# Patient Record
Sex: MALE | Race: WHITE
[De-identification: names, ages, dates, MRNs, and addresses within clinical notes are randomized per-mention and may not be internally consistent; named-entity substitution may affect disease eponyms.]

---

## 2020-01-08 ENCOUNTER — HOSPITAL ENCOUNTER (OUTPATIENT)
Dept: HOSPITAL 76 - DI | Age: 65
Discharge: HOME | End: 2020-01-08
Attending: INTERNAL MEDICINE
Payer: MEDICARE

## 2020-01-08 DIAGNOSIS — M25.471: ICD-10-CM

## 2020-01-08 DIAGNOSIS — M25.571: Primary | ICD-10-CM

## 2020-01-08 NOTE — XRAY REPORT
Reason:  ANKLE PAIN

Procedure Date:  01/08/2020   

Accession Number:  [id number] / [id number]                    

Procedure:  XR  - Ankle 3 View RT CPT Code:  

 

***Final Report***

 

 

FULL RESULT:

 

 

EXAM:

RIGHT ANKLE RADIOGRAPHY

 

EXAM DATE: 1/8/2020 01:00 PM.

 

CLINICAL HISTORY: ANKLE PAIN. Twisted ankle on 12/26/2019.

 

COMPARISON: None.

 

TECHNIQUE: 3 views.

 

FINDINGS:

Bones: There is a screw from medial to lateral within the body of the 

talus. There are no lucent or sclerotic lesions. There is no fracture.

 

Joints: Small ankle joint effusion. No subluxations. The ankle mortise is 

normally aligned.

 

Soft Tissues: Normal. No soft tissue swelling.

IMPRESSION:

1. Small right ankle joint effusion.

2. Screw in the body of the talus.

3. No fracture or malalignment.

 

RADIA

 

The call report notification system was initiated by Dr. Shankar Pak at 

01:27 PM on 1/8/2020.

## 2020-03-16 ENCOUNTER — HOSPITAL ENCOUNTER (OUTPATIENT)
Dept: HOSPITAL 76 - DI | Age: 65
Discharge: HOME | End: 2020-03-16
Attending: INTERNAL MEDICINE
Payer: MEDICARE

## 2020-03-16 DIAGNOSIS — M25.571: Primary | ICD-10-CM

## 2020-03-16 NOTE — XRAY REPORT
Reason:  PAIN IN RIGHT ANKLE AND JOINTS OF RIGHT FOOT

Procedure Date:  03/16/2020   

Accession Number:  [id number] / [id number]                    

Procedure:  XR  - Foot 3 View RT CPT Code:  

 

***Final Report***

 

 

FULL RESULT:

 

 

EXAM:

RIGHT FOOT RADIOGRAPHY

 

EXAM DATE: 3/16/2020 12:30 PM.

 

CLINICAL HISTORY: Pain in right ankle and joints of right foot.

 

COMPARISON: None.

 

TECHNIQUE: 3 views.

 

FINDINGS:

Bones: There are cortical step-offs at the mid diaphysis of the 1st 

proximal phalanx, consistent with fracture. No additional fractures 

detected. Intact fixation screw at the talus. No abnormal subcutaneous 

radiopaque foreign bodies identified.

IMPRESSION: Findings consistent with nondisplaced fracture at the 1st 

proximal phalanx.

 

RADIA

## 2020-03-16 NOTE — XRAY REPORT
Reason:  PAIN IN RIGHT ANKLE AND JOINTS OF RIGHT FOOT

Procedure Date:  03/16/2020   

Accession Number:  [id number] / [id number]                    

Procedure:  XR  - Ankle 3 View RT CPT Code:  

 

***Final Report***

 

 

FULL RESULT:

 

 

EXAM:

RIGHT ANKLE RADIOGRAPHY

 

EXAM DATE: 03/16/2020 12:30 PM.

 

CLINICAL HISTORY: Pain in right ankle and joints of right foot.

 

COMPARISON: ANKLE 3 VIEW RT 01/08/2020 12:41 PM.

 

TECHNIQUE: 3 views.

 

FINDINGS:

Bones: Intact screw at the talus. Subtle cortical step-off suggesting 

possible flake fracture at the distal aspect of the dorsal talus, seen on 

lateral view. No fractures identified elsewhere.

 

Joints: Small ankle joint effusion suggested. Soft tissue prominence at 

the ankle.

IMPRESSION: Question of small flake fracture at the distal talus, seen on 

lateral view.

 

RADIA

## 2021-03-30 ENCOUNTER — HOSPITAL ENCOUNTER (INPATIENT)
Dept: HOSPITAL 76 - ED | Age: 66
LOS: 2 days | Discharge: HOME | DRG: 92 | End: 2021-04-01
Attending: NURSE PRACTITIONER | Admitting: INTERNAL MEDICINE
Payer: MEDICARE

## 2021-03-30 DIAGNOSIS — E72.20: ICD-10-CM

## 2021-03-30 DIAGNOSIS — Z79.899: ICD-10-CM

## 2021-03-30 DIAGNOSIS — J44.9: ICD-10-CM

## 2021-03-30 DIAGNOSIS — G40.919: ICD-10-CM

## 2021-03-30 DIAGNOSIS — G93.89: ICD-10-CM

## 2021-03-30 DIAGNOSIS — G92: Primary | ICD-10-CM

## 2021-03-30 DIAGNOSIS — Z20.822: ICD-10-CM

## 2021-03-30 DIAGNOSIS — Z79.51: ICD-10-CM

## 2021-03-30 DIAGNOSIS — F17.210: ICD-10-CM

## 2021-03-30 DIAGNOSIS — T42.6X5A: ICD-10-CM

## 2021-03-30 DIAGNOSIS — E87.0: ICD-10-CM

## 2021-03-30 LAB
ALBUMIN DIAFP-MCNC: 3.3 G/DL (ref 3.2–5.5)
ALBUMIN/GLOB SERPL: 1.1 {RATIO} (ref 1–2.2)
ALP SERPL-CCNC: 45 IU/L (ref 42–121)
ALT SERPL W P-5'-P-CCNC: 18 IU/L (ref 10–60)
AMPHET UR QL SCN: NEGATIVE
ANION GAP SERPL CALCULATED.4IONS-SCNC: 11 MMOL/L (ref 6–13)
ANION GAP SERPL CALCULATED.4IONS-SCNC: 11 MMOL/L (ref 6–13)
ANION GAP SERPL CALCULATED.4IONS-SCNC: 15 MMOL/L (ref 6–13)
AST SERPL W P-5'-P-CCNC: 20 IU/L (ref 10–42)
BARBITURATES UR QL SCN>300 NG/ML: NEGATIVE
BASOPHILS NFR BLD AUTO: 0 10^3/UL (ref 0–0.1)
BASOPHILS NFR BLD AUTO: 0.4 %
BENZODIAZ UR QL SCN: POSITIVE
BILIRUB BLD-MCNC: 0.5 MG/DL (ref 0.2–1)
BUN SERPL-MCNC: 20 MG/DL (ref 6–20)
BUN SERPL-MCNC: 20 MG/DL (ref 6–20)
BUN SERPL-MCNC: 22 MG/DL (ref 6–20)
CALCIUM UR-MCNC: 9.5 MG/DL (ref 8.5–10.3)
CALCIUM UR-MCNC: 9.6 MG/DL (ref 8.5–10.3)
CALCIUM UR-MCNC: 9.7 MG/DL (ref 8.5–10.3)
CHLORIDE SERPL-SCNC: 107 MMOL/L (ref 101–111)
CHLORIDE SERPL-SCNC: 111 MMOL/L (ref 101–111)
CHLORIDE SERPL-SCNC: 111 MMOL/L (ref 101–111)
CO2 SERPL-SCNC: 24 MMOL/L (ref 21–32)
CO2 SERPL-SCNC: 24 MMOL/L (ref 21–32)
CO2 SERPL-SCNC: 25 MMOL/L (ref 21–32)
COCAINE UR-SCNC: NEGATIVE UMOL/L
CREAT SERPLBLD-SCNC: 0.7 MG/DL (ref 0.6–1.2)
EOSINOPHIL # BLD AUTO: 0.2 10^3/UL (ref 0–0.7)
EOSINOPHIL NFR BLD AUTO: 2 %
ERYTHROCYTE [DISTWIDTH] IN BLOOD BY AUTOMATED COUNT: 12.5 % (ref 12–15)
ETHANOL BLD-MCNC: < 5 MG/DL
GFRSERPLBLD MDRD-ARVRAT: 114 ML/MIN/{1.73_M2} (ref 89–?)
GLOBULIN SER-MCNC: 3 G/DL (ref 2.1–4.2)
GLUCOSE SERPL-MCNC: 105 MG/DL (ref 70–100)
GLUCOSE SERPL-MCNC: 109 MG/DL (ref 70–100)
GLUCOSE SERPL-MCNC: 94 MG/DL (ref 70–100)
HCT VFR BLD AUTO: 40.4 % (ref 42–52)
HGB UR QL STRIP: 12.9 G/DL (ref 14–18)
LYMPHOCYTES # SPEC AUTO: 2.6 10^3/UL (ref 1.5–3.5)
LYMPHOCYTES NFR BLD AUTO: 35.3 %
MCH RBC QN AUTO: 31.5 PG (ref 27–31)
MCHC RBC AUTO-ENTMCNC: 31.9 G/DL (ref 32–36)
MCV RBC AUTO: 98.5 FL (ref 80–94)
METHADONE UR QL SCN: NEGATIVE
METHAMPHET UR QL SCN: NEGATIVE
MONOCYTES # BLD AUTO: 1.1 10^3/UL (ref 0–1)
MONOCYTES NFR BLD AUTO: 14.5 %
NEUTROPHILS # BLD AUTO: 3.5 10^3/UL (ref 1.5–6.6)
NEUTROPHILS # SNV AUTO: 7.4 X10^3/UL (ref 4.8–10.8)
NEUTROPHILS NFR BLD AUTO: 47.4 %
NRBC # BLD AUTO: 0 /100WBC
NRBC # BLD AUTO: 0 X10^3/UL
OPIATES UR QL SCN: NEGATIVE
PDW BLD AUTO: 11.6 FL (ref 7.4–11.4)
PLATELET # BLD: 155 10^3/UL (ref 130–450)
POTASSIUM SERPL-SCNC: 4.1 MMOL/L (ref 3.5–5)
POTASSIUM SERPL-SCNC: 4.2 MMOL/L (ref 3.5–5)
POTASSIUM SERPL-SCNC: 4.7 MMOL/L (ref 3.5–5)
PROT SPEC-MCNC: 6.3 G/DL (ref 6.7–8.2)
RBC MAR: 4.1 10^6/UL (ref 4.7–6.1)
SODIUM SERPLBLD-SCNC: 146 MMOL/L (ref 135–145)
SODIUM SERPLBLD-SCNC: 146 MMOL/L (ref 135–145)
SODIUM SERPLBLD-SCNC: 147 MMOL/L (ref 135–145)
THC UR QL SCN: NEGATIVE
VALPROATE SERPL-SCNC: 60.6 UG/ML
VALPROATE SERPL-SCNC: 72 UG/ML
VALPROATE SERPL-SCNC: 82.6 UG/ML
VOLATILE DRUGS POS SERPL SCN: (no result)

## 2021-03-30 PROCEDURE — 82140 ASSAY OF AMMONIA: CPT

## 2021-03-30 PROCEDURE — 83735 ASSAY OF MAGNESIUM: CPT

## 2021-03-30 PROCEDURE — 85025 COMPLETE CBC W/AUTO DIFF WBC: CPT

## 2021-03-30 PROCEDURE — 80306 DRUG TEST PRSMV INSTRMNT: CPT

## 2021-03-30 PROCEDURE — 80320 DRUG SCREEN QUANTALCOHOLS: CPT

## 2021-03-30 PROCEDURE — 80048 BASIC METABOLIC PNL TOTAL CA: CPT

## 2021-03-30 PROCEDURE — 80164 ASSAY DIPROPYLACETIC ACD TOT: CPT

## 2021-03-30 PROCEDURE — 84100 ASSAY OF PHOSPHORUS: CPT

## 2021-03-30 PROCEDURE — 0202U NFCT DS 22 TRGT SARS-COV-2: CPT

## 2021-03-30 PROCEDURE — 96361 HYDRATE IV INFUSION ADD-ON: CPT

## 2021-03-30 PROCEDURE — 36415 COLL VENOUS BLD VENIPUNCTURE: CPT

## 2021-03-30 PROCEDURE — 96374 THER/PROPH/DIAG INJ IV PUSH: CPT

## 2021-03-30 PROCEDURE — 94640 AIRWAY INHALATION TREATMENT: CPT

## 2021-03-30 PROCEDURE — 99285 EMERGENCY DEPT VISIT HI MDM: CPT

## 2021-03-30 PROCEDURE — 80053 COMPREHEN METABOLIC PANEL: CPT

## 2021-03-30 PROCEDURE — 93005 ELECTROCARDIOGRAM TRACING: CPT

## 2021-03-30 PROCEDURE — 87631 RESP VIRUS 3-5 TARGETS: CPT

## 2021-03-30 PROCEDURE — 70450 CT HEAD/BRAIN W/O DYE: CPT

## 2021-03-30 RX ADMIN — SODIUM CHLORIDE, PRESERVATIVE FREE SCH ML: 5 INJECTION INTRAVENOUS at 23:44

## 2021-03-30 RX ADMIN — LACTULOSE SCH GM: 10 SOLUTION ORAL at 23:44

## 2021-03-30 NOTE — ED PHYSICIAN DOCUMENTATION
History of Present Illness





- Stated complaint


Stated Complaint: WOBBLY/INCOHERENT





- Chief complaint


Chief Complaint: General





- History obtained from


History obtained from: Patient, Family (sister)





- Additonal information


Additional information: 





65-year-old gentleman arrives accompanied by his sister to the emergency 

department for evaluation of altered mental status this morning.  He has a long 

history of epilepsy since age 9 and has had a couple of craniotomies for same 

the most recent being 40 years ago.  He is never been seizure-free and he is 

still maintained on multiple seizure medications including Fycompa Depakote 

Trileptal and lorazepam.  Recently the Fycompa doses have been going up.  Early 

this morning he awoke with some neck pain and took a Percogesic.  Then got up 

later and his sister said he was quite slow to answer questions and nodding off 

and his brain just did not seem to be working right.  He seems back to baseline 

now without specific complaints.  It is unclear if he took a single Percogesic 

or multiple since the patient does not actually remember getting up and taking 

it.





Review of Systems


Ten Systems: 10 systems reviewed and negative


Constitutional: denies: Fever, Chills


Eyes: reports: Reviewed and negative


Ears: reports: Reviewed and negative


Nose: reports: Reviewed and negative


Throat: reports: Reviewed and negative


Cardiac: reports: Reviewed and negative





PD PAST MEDICAL HISTORY





- Past Medical History


Past Medical History: Yes


Respiratory: COPD


Neuro: Seizure disorder





- Past Surgical History


Past Surgical History: Yes


Neuro: Craniotomy





- Present Medications


Home Medications: 


                                Ambulatory Orders











 Medication  Instructions  Recorded  Confirmed


 


Albuterol Sulf [Ventolin Hfa 40 mg .ROUTE DAILY 03/30/21 03/30/21





Inhaler]   


 


Atenolol [Tenormin] 25 mg PO DAILY 03/30/21 03/30/21


 


Budesonide [Pulmicort Flexhaler] 80 mcg .ROUTE BID 03/30/21 03/30/21


 


Divalproex ER [Depakote ER] 250 mg PO QID 03/30/21 03/30/21


 


Lorazepam [Ativan] 1 mg PO BID 03/30/21 03/30/21


 


OXcarbazepine [Trileptal] 600 mg PO BID 03/30/21 03/30/21


 


Perampanel [Fycompa] 8 mg PO DAILY 03/30/21 03/30/21


 


Pravastatin [Pravachol] 40 mg PO DAILY 03/30/21 03/30/21


 


Umeclidinium Bromide [Incruse 62.5 mcg .ROUTE DAILY 03/30/21 03/30/21





Ellipta]   


 


Zonisamide [Zonegran] 100 mg PO QID 03/30/21 03/30/21














- Allergies


Allergies/Adverse Reactions: 


                                    Allergies











Allergy/AdvReac Type Severity Reaction Status Date / Time


 


Iodinated Contrast Media Allergy  Rash Verified 03/30/21 13:02














- Social History


Does the pt smoke?: Yes


Smoking Status: Current every day smoker


Does the pt drink ETOH?: No


Does the pt have substance abuse?: Yes


Substance Use and Type: CBD oil / Products





- Immunizations


Immunizations are current?: Yes





- POLST


Patient has POLST: No





PD ED PE NORMAL





- Vitals


Vital signs reviewed: Yes





- General


General: No acute distress, Other (He is slow to answer questions, states the 

date is March 28 of 21, has some word finding difficulties, but the sister says 

he is at his baseline.)





- HEENT


HEENT: PERRL (With nystagmus)





- Neck


Neck: Supple, no meningeal sign, No bony TTP





- Cardiac


Cardiac: RRR, No murmur





- Respiratory


Respiratory: No respiratory distress





- Abdomen


Abdomen: Normal bowel sounds, Soft, Non tender





- Back


Back: No CVA TTP, No spinal TTP





- Derm


Derm: Normal color, Warm and dry





- Extremities


Extremities: No edema, No calf tenderness / cord





- Neuro


Neuro: No motor deficit, No sensory deficit, Other (He is alert oriented to 

person and place and close on time.  He has good strength throughout all 4 

extremities.  He has nystagmus and mild ataxia on finger-to-nose testing.)


Eye Opening: Spontaneous


Motor: Obeys Commands





- Psych


Psych: Normal mood, Normal affect





Results





- Vitals


Vitals: 


                               Vital Signs - 24 hr











  03/30/21 03/30/21 03/30/21





  13:03 13:36 14:48


 


Temperature 36.4 C L  


 


Heart Rate 57 L 58 L 55 L


 


Respiratory 18 14 21





Rate   


 


Blood Pressure 104/58 L 113/57 L 117/63


 


O2 Saturation 99 100 97














  03/30/21 03/30/21 03/30/21





  16:10 16:56 18:09


 


Temperature 36.7 C  


 


Heart Rate 61 55 L 59 L


 


Respiratory 20 20 23





Rate   


 


Blood Pressure 129/67 113/61 118/72


 


O2 Saturation 99 100 100














  03/30/21





  20:07


 


Temperature 


 


Heart Rate 56 L


 


Respiratory 18





Rate 


 


Blood Pressure 117/68


 


O2 Saturation 100








                                     Oxygen











O2 Source                      Room air

















- EKG (time done)


  ** 1508


Rate: Rate (enter#) (55)


Rhythm: NSR


Axis: Normal


Intervals: Normal [place]


QRS: Normal


Ischemia: Normal ST segments


Computer interpretation: Agree with computer





- Labs


Labs: 


                                Laboratory Tests











  03/30/21 03/30/21 03/30/21





  14:26 14:26 14:26


 


WBC  7.4  


 


RBC  4.10 L  


 


Hgb  12.9 L  


 


Hct  40.4 L  


 


MCV  98.5 H  


 


MCH  31.5 H  


 


MCHC  31.9 L  


 


RDW  12.5  


 


Plt Count  155  


 


MPV  11.6 H  


 


Neut # (Auto)  3.5  


 


Lymph # (Auto)  2.6  


 


Mono # (Auto)  1.1 H  


 


Eos # (Auto)  0.2  


 


Baso # (Auto)  0.0  


 


Absolute Nucleated RBC  0.00  


 


Nucleated RBC %  0.0  


 


Sodium   147 H 


 


Potassium   4.7 


 


Chloride   111 


 


Carbon Dioxide   25 


 


Anion Gap   11.0 


 


BUN   20 


 


Creatinine   0.7 


 


Estimated GFR (MDRD)   114 


 


Glucose   94 


 


Calcium   9.6 


 


Total Bilirubin   0.5 


 


AST   20 


 


ALT   18 


 


Alkaline Phosphatase   45 


 


Ammonia    66.1 H


 


Total Protein   6.3 L 


 


Albumin   3.3 


 


Globulin   3.0 


 


Albumin/Globulin Ratio   1.1 


 


Last Dose Date   Not Reportable 


 


Last Dose Time   Not Reportable 


 


Urine Opiates Screen   


 


Ur Oxycodone Screen   


 


Urine Methadone Screen   


 


Ur Propoxyphene Screen   


 


Ur Barbiturates Screen   


 


Valproic Acid   82.6 


 


Ur Tricyclics Screen   


 


Ur Phencyclidine Scrn   


 


Ur Amphetamine Screen   


 


U Methamphetamines Scrn   


 


U Benzodiazepines Scrn   


 


Urine Cocaine Screen   


 


U Cannabinoids Screen   


 


Ethyl Alcohol   < 5.0 














  03/30/21 03/30/21 03/30/21





  16:21 17:55 17:55


 


WBC   


 


RBC   


 


Hgb   


 


Hct   


 


MCV   


 


MCH   


 


MCHC   


 


RDW   


 


Plt Count   


 


MPV   


 


Neut # (Auto)   


 


Lymph # (Auto)   


 


Mono # (Auto)   


 


Eos # (Auto)   


 


Baso # (Auto)   


 


Absolute Nucleated RBC   


 


Nucleated RBC %   


 


Sodium   146 H 


 


Potassium   4.1 


 


Chloride   111 


 


Carbon Dioxide   24 


 


Anion Gap   11.0 


 


BUN   22 H 


 


Creatinine   0.7 


 


Estimated GFR (MDRD)   114 


 


Glucose   105 H 


 


Calcium   9.7 


 


Total Bilirubin   


 


AST   


 


ALT   


 


Alkaline Phosphatase   


 


Ammonia    67.0 H


 


Total Protein   


 


Albumin   


 


Globulin   


 


Albumin/Globulin Ratio   


 


Last Dose Date   UNKNOWN 


 


Last Dose Time   UNKNOWN 


 


Urine Opiates Screen  NEGATIVE  


 


Ur Oxycodone Screen  NEGATIVE  


 


Urine Methadone Screen  NEGATIVE  


 


Ur Propoxyphene Screen  NEGATIVE  


 


Ur Barbiturates Screen  NEGATIVE  


 


Valproic Acid   72.0 


 


Ur Tricyclics Screen  NEGATIVE  


 


Ur Phencyclidine Scrn  NEGATIVE  


 


Ur Amphetamine Screen  NEGATIVE  


 


U Methamphetamines Scrn  NEGATIVE  


 


U Benzodiazepines Scrn  POSITIVE H  


 


Urine Cocaine Screen  NEGATIVE  


 


U Cannabinoids Screen  NEGATIVE  


 


Ethyl Alcohol   














  03/30/21 03/30/21





  21:41 21:41


 


WBC  


 


RBC  


 


Hgb  


 


Hct  


 


MCV  


 


MCH  


 


MCHC  


 


RDW  


 


Plt Count  


 


MPV  


 


Neut # (Auto)  


 


Lymph # (Auto)  


 


Mono # (Auto)  


 


Eos # (Auto)  


 


Baso # (Auto)  


 


Absolute Nucleated RBC  


 


Nucleated RBC %  


 


Sodium  146 H 


 


Potassium  4.2 


 


Chloride  107 


 


Carbon Dioxide  24 


 


Anion Gap  15.0 H 


 


BUN  20 


 


Creatinine  0.7 


 


Estimated GFR (MDRD)  114 


 


Glucose  109 H 


 


Calcium  9.5 


 


Total Bilirubin  


 


AST  


 


ALT  


 


Alkaline Phosphatase  


 


Ammonia   82.4 H*


 


Total Protein  


 


Albumin  


 


Globulin  


 


Albumin/Globulin Ratio  


 


Last Dose Date  Not Reportable 


 


Last Dose Time  Not Reportable 


 


Urine Opiates Screen  


 


Ur Oxycodone Screen  


 


Urine Methadone Screen  


 


Ur Propoxyphene Screen  


 


Ur Barbiturates Screen  


 


Valproic Acid  60.6 


 


Ur Tricyclics Screen  


 


Ur Phencyclidine Scrn  


 


Ur Amphetamine Screen  


 


U Methamphetamines Scrn  


 


U Benzodiazepines Scrn  


 


Urine Cocaine Screen  


 


U Cannabinoids Screen  


 


Ethyl Alcohol  














- Rads (name of study)


  ** Ct Head


Radiology: Final report received, Discussed with dallin [facility]





- ED course


ED course: 





65-year-old gentleman with long history of epilepsy on multiple meds for same 

presents with altered mental status now improved.  He did have mild asterixis on

 exam and is on Depakote.  So VHE is a possibility as well as hyponatremia from 

the Trileptal, or supratherapeutic levels of any of his other meds.  He was 

found to have hyperammonemia at a level of 66 with a therapeutic Depakote level 

at 82.6.  This was discussed with poison control and they recommend p.o. fluids,

 free water especially noting the sodium is up and a redraw in 4 hours to make 

sure it is improving and if so he could probably be discharged if no other 

pertinent positive findings on a decreased dose of Depakote.





IMPRESSION: 


1. CT head without acute intracranial abnormalities. Specifically, no acute 

hemorrhage, suspicious 


mass, or mass effect. 


2. 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 

changes of right frontal


craniotomy. 


3. Age-related senescent changes and sequela of chronic small vessel ischemic 

disease. 


4. Asymmetrically sized choroid plexus with the right side being larger than the

 left. No mass 


effect or hydrocephalus. 





At 6 PM the levels were redrawn, his Depakote level went down to 72 but the 

ammonia was basically the same.  I recontacted poison control and this time he 

had me to speak with the , Dr. Padilla.  He said to check it again 

in 4 hours because they definitely want to see the ammonia trending down before 

discharge.





10 PM levels unfortunately continue to show even more of an elevation in ammonia

 and spoke with Dr. Mills and he will observe the patient.





Departure





- Departure


Disposition: ED Place in Observation


Clinical Impression: 


 Hyperammonemia, Encephalopathy





Epilepsy


Qualifiers:


 Epilepsy type: unspecified Intractability: intractable Status epilepticus: 

without status epilepticus Qualified Code(s): G40.919 - Epilepsy, unspecified, 

intractable, without status epilepticus





Condition: Stable

## 2021-03-30 NOTE — CT REPORT
PROCEDURE:  HEAD WO

 

INDICATIONS:  altered

 

TECHNIQUE:  

Noncontrast 4.5 mm thick angled axial sections acquired from the foramen magnum to the vertex.  For r
adiation dose reduction, the following was used:  automated exposure control, adjustment of mA and/or
 kV according to patient size.

 

COMPARISON:  None.

 

FINDINGS:  

Image quality:  Excellent.  

 

CSF spaces:  Basal cisterns are patent.  No extra-axial fluid collections.  Ventricles are symmetric 
in size and configuration.  

 

Brain:  No midline shift.  No intracranial masses or acute intracranial hemorrhage.  Gray-white matte
r interface is normal.  There is right frontoparietal encephalomalacia underlying postsurgical change
s of previous right frontal craniotomy. There is asymmetric size of choroid plexus bilaterally. This 
is seen in the trigone region of the lateral ventricles. The right side is larger than the left. Chor
oid plexus calcifications are present. There is also mild diffuse cortical volume loss. Mild perivent
ricular white matter hypodensity in relation compatible with sequela of chronic small vessel ischemic
 disease. Atherosclerotic calcifications of the bilateral intracranial segments of the internal carot
id arteries are visualized.

 

Skull and face:  Calvarium and visualized facial bones are intact, without suspicious lesions or acut
e calvarial fractures. Postoperative changes from remote right frontal craniotomy..  

 

Sinuses:  Visualized sinuses and mastoids are clear.  

 

IMPRESSION:  

1. CT head without acute intracranial abnormalities. Specifically, no acute hemorrhage, suspicious ma
ss, or mass effect.

2. Chronic right frontoparietal encephalomalacia underlying prior surgical changes of right frontal c
raniotomy.

3. Age-related senescent changes and sequela of chronic small vessel ischemic disease.

4. Asymmetrically sized choroid plexus with the right side being larger than the left. No mass effect
 or hydrocephalus.

 

Reviewed by: David Wang MD on 3/30/2021 3:41 PM PDT

Approved by: David Wang MD on 3/30/2021 3:41 PM PDT

 

 

Station ID:  [id number]

## 2021-03-30 NOTE — HISTORY & PHYSICAL EXAMINATION
Chief Complaint





- Chief Complaint


Chief Complaint: Unsteadiness





History of Present Illness





- Admitted From


Admitted From:: Home





- History Obtained From


Records Reviewed: Yes


History obtained from: Patient, Sister, ER Physician, EMR





- History of Present Illness


HPI Comment/Other: 


This is a 65-year-old male with a past medical history significant for epilepsy,

COPD who presents today from home as his sister was concerned that he was quite 

unsteady on his feet.  History is obtained from both the patient and his sister 

who is present at bedside.  The patient has unfortunately poorly controlled 

epilepsy with near daily seizures.  He is on multiple antiepileptics at home.  

His sister tells me that he is normally a little unsteady and can be interm

ittently confused and due to the antiepileptics as well as occasional episodes 

of being postictal.  He can also be mildly sedated at times it due to the Ativan

he is on.  She states that this morning he was much more unsteady compared to 

usual and was not himself.  The patient admits to taking Percogesic and believes

he only took 1 tablet last night.  He takes this once a week for ankle pain 

which is chronic for him.  His most recent seizure was today in the emergency 

department.  He reports that they are usually minor and predominately involve 

his head and right upper extremity.  He does not have grand mal seizures.  His 

neurologist is at [facility].  He was recently started on a new medication 

called Fycompa.  He has been on Depakote for many years.  He denies any prior 

history of liver disease.  He does not drink alcohol.  He denies passing out 

today.





In the emergency department, he was found to be afebrile with temperature of 

36.4 C.  His heart rate was in the 50s.  Blood pressure was 113/57.  He was not

tachypneic and saturating well on room air.  Labs were significant for his 

sodium of 147 and an ammonia level of 66.1.  Initial Depakote level is 82.6.  

These findings were discussed with poison control who recommended rechecking 

labs in 4 hours.  His Depakote level was trending down to 72 but his ammonia 

remained elevated at 67.  They recommended rechecking labs again in 4 hours to 

ensure that the ammonia begins to trend down.  This later increased to 82.4.  

Given the rise in the ammonia level, medicine was consulted for admission.





I did discuss goals of care the patient and he would like to be a full code.





History





- Past Medical History


Respiratory: reports: COPD


Neuro: reports: Seizure disorder


MRSA Hx?: No





- Past Surgical History


Neuro: reports: Craniotomy





- Family & Social History


Family History Comment/Other: His father passed away from metastatic renal 

cancer.  His mother had hypertension. His older brother was recently diagnosed 

with coronary artery disease and underwent CABG.


Living arrangement: At home


Living Situation: With family


Social History Notes: He lives at home with his sister, Simran.  He previously 

worked at [employer] and retired in 1995.  He smokes a pack a day for about

25 years.  Denies alcohol use.





- POLST


Patient has POLST: No





Meds/Allgy





- Home Medications


Home Medications: 


                                Ambulatory Orders











 Medication  Instructions  Recorded  Confirmed


 


Albuterol Sulf [Ventolin Hfa 40 mg .ROUTE DAILY 03/30/21 03/30/21





Inhaler]   


 


Atenolol [Tenormin] 25 mg PO DAILY 03/30/21 03/30/21


 


Budesonide [Pulmicort Flexhaler] 80 mcg .ROUTE BID 03/30/21 03/30/21


 


Divalproex ER [Depakote ER] 250 mg PO QID 03/30/21 03/30/21


 


Lorazepam [Ativan] 1 mg PO BID 03/30/21 03/30/21


 


OXcarbazepine [Trileptal] 600 mg PO BID 03/30/21 03/30/21


 


Perampanel [Fycompa] 8 mg PO DAILY 03/30/21 03/30/21


 


Pravastatin [Pravachol] 40 mg PO DAILY 03/30/21 03/30/21


 


Umeclidinium Bromide [Incruse 62.5 mcg .ROUTE DAILY 03/30/21 03/30/21





Ellipta]   


 


Zonisamide [Zonegran] 100 mg PO QID 03/30/21 03/30/21














- Allergies


Allergies/Adverse Reactions: 


                                    Allergies











Allergy/AdvReac Type Severity Reaction Status Date / Time


 


Iodinated Contrast Media Allergy  Rash Verified 03/30/21 13:02














Review of Systems





- Constitutional


Constitutional: denies: Fatigue, Fever, Chills





- Eyes


Eyes: denies: Blurred vision





- Ears, Nose & Throat


Ears, Nose & Throat: denies: Nasal discharge, Sore throat





- Cardiovascular


Cariovascular: denies: Chest pain, Edema, Lightheadedness, Syncope, Exertional 

dyspnea, Decr. exercise tolerance





- Respiratory


Respiratory: denies: Cough, SOB at rest, SOB with exertion





- Gastrointestinal


Gastrointestinal: denies: Abdominal pain, Constipation, Nausea, Vomiting





- Genitourinary


Genitourinary: denies: Dysuria, Urgency, Hematuria





- Musculoskeletal


Musculoskeletal: reports: Joint pain





- Neurological


Neurological: reports: Abnormal gait, Seizures.  denies: General weakness, Focal

 weakness, Dizziness, Numbness





- Hematologic/Lymphatic


Hematologic/Lymphatic: denies: Bleeding tendencies





- All Other Systems


All Other Systems: reports: Reviewed and negative


Prior Level of Functionality: 


He ambulates with a walker at baseline due to ankle pain.





Exam





- Vital Signs


Reviewed Vital Signs: Yes


Vital Signs: 





                                Vital Signs x48h











  Temp Pulse Resp BP Pulse Ox


 


 03/30/21 20:07   56 L  18  117/68  100


 


 03/30/21 18:09   59 L  23  118/72  100


 


 03/30/21 16:56   55 L  20  113/61  100


 


 03/30/21 16:10  36.7 C  61  20  129/67  99


 


 03/30/21 14:48   55 L  21  117/63  97














- Physical Exam


General Appearance: positive: No acute distress, Alert


Eyes Bilateral: positive: Normal inspection, Conjunctivae nml


ENT: positive: ENT inspection nml


Neck: positive: Nml inspection


Respiratory: positive: No respiratory distress.  negative: Wheezes, Rales


Cardiovascular: positive: Regular rate & rhythm, No murmur.  negative: 

Tachycardia


Abdomen: positive: Non-tender, No distention.  negative: Tenderness, Guarding, 

Rebound


Skin: positive: Warm, Dry


Extremities: positive: No pedal edema


Neurologic/Psychiatric: positive: Sensation nml, Other (Asterixis noted.  He has

 5 out of 5 motor strength in all 4 extremities.).  negative: Disoriented to 

person, Disoriented to place, Disoriented to time, Facial droop, Slurred/abnml 

speech





Conclusion/Plan





- Problem List


(1) Encephalopathy


Conclusion/Plan: 


This appears to be secondary to hyperammonemia due to the Depakote.  The CT of 

the head showed no acute abnormalities but revealed chronic encephalomalacia.  

His ammonia level continues to increase despite trending down of his Depakote 

level.  We will hold his Depakote for the time being and start him on lactulose.

  Will discuss with pharmacy if carnitine is available.  We will recheck ammonia

 in the morning and I am hopeful if his mentation is improving and it is 

trending down then he can be discharged home.








(2) Hyperammonemia


Conclusion/Plan: 


This is likely secondary to the Depakote.  He does not have evidence of liver 

disease.  This is contributing to his encephalopathy.  We will start him on 

lactulose and hold Depakote.  Will discuss with pharmacy if carnitine is 

available.








(3) Epilepsy


Conclusion/Plan: 


He unfortunately has epilepsy that is poorly controlled with seizures on a near 

daily basis.  We will continue his home antiepileptics but will hold his 

Depakote given the hyperammonemia.  We will asked the daytime provider to 

discuss with neurology regarding other antibiotics that can be used as Depakote 

will likely need to be discontinued on discharge.


Qualifiers: 


   Epilepsy type: unspecified   Intractability: intractable   Status 

epilepticus: without status epilepticus   Qualified Code(s): G40.919 - Epilepsy,

 unspecified, intractable, without status epilepticus   





(4) Hypernatremia


Conclusion/Plan: 


His sodium is elevated at 146 and this is likely due to poor oral intake.  He 

did receive D5/half-normal in the ER without improvement.  We will place him on 

D5 at 100 mL an hour and recheck a BMP in the morning.  We will encourage water 

intake.








(5) COPD (chronic obstructive pulmonary disease)


Conclusion/Plan: 


Stable and not in exacerbation.  We will continue his home inhalers and albutero

l as needed.








- Lab Results


Lab results reviewed: Yes


Fish Bones: 


                                 03/30/21 14:26





                                 03/30/21 21:41





- Diagnostic Imaging Results


Diagnostic Imaging Results: positive: Final report reviewed





Core Measures





- Anticipated LOS


I expect patient to be DC'd or transferred within 96 hours.: Yes





- Issues


Hospital Issues and Management Plan: 


This is a 65-year-old male with epilepsy who presents due to altered mental 

status.  Found to have hyperammonemia that continues to increase despite 

ringdown of his valproic acid.  Will place in observation for lactulose and 

trending of his ammonia.





- DVT/VTE - Prophylaxis


VTE/DVT Device ordered at admit?: Yes


VTE/DVT Prophylaxis med ordered at admit?: No


Not Ordered - Medical Reason: Not indicated

## 2021-03-31 LAB
ANION GAP SERPL CALCULATED.4IONS-SCNC: 7 MMOL/L (ref 6–13)
B PARAPERT DNA SPEC QL NAA+PROBE: NOT DETECTED
B PERT DNA SPEC QL NAA+PROBE: NOT DETECTED
BASOPHILS NFR BLD AUTO: 0 10^3/UL (ref 0–0.1)
BASOPHILS NFR BLD AUTO: 0.6 %
BUN SERPL-MCNC: 19 MG/DL (ref 6–20)
C PNEUM DNA NPH QL NAA+NON-PROBE: NOT DETECTED
CALCIUM UR-MCNC: 8.3 MG/DL (ref 8.5–10.3)
CHLORIDE SERPL-SCNC: 112 MMOL/L (ref 101–111)
CO2 SERPL-SCNC: 21 MMOL/L (ref 21–32)
CREAT SERPLBLD-SCNC: 0.7 MG/DL (ref 0.6–1.2)
EOSINOPHIL # BLD AUTO: 0.2 10^3/UL (ref 0–0.7)
EOSINOPHIL NFR BLD AUTO: 3.6 %
ERYTHROCYTE [DISTWIDTH] IN BLOOD BY AUTOMATED COUNT: 12 % (ref 12–15)
FLUAV RNA RESP QL NAA+PROBE: NOT DETECTED
GFRSERPLBLD MDRD-ARVRAT: 114 ML/MIN/{1.73_M2} (ref 89–?)
GLUCOSE SERPL-MCNC: 95 MG/DL (ref 70–100)
HAEM INFLU B DNA SPEC QL NAA+PROBE: NOT DETECTED
HCOV 229E RNA SPEC QL NAA+PROBE: NOT DETECTED
HCOV HKU1 RNA UPPER RESP QL NAA+PROBE: NOT DETECTED
HCOV NL63 RNA ASPIRATE QL NAA+PROBE: NOT DETECTED
HCOV OC43 RNA SPEC QL NAA+PROBE: NOT DETECTED
HCT VFR BLD AUTO: 37.5 % (ref 42–52)
HGB UR QL STRIP: 12.2 G/DL (ref 14–18)
HMPV AG SPEC QL: NOT DETECTED
HPIV1 RNA NPH QL NAA+PROBE: NOT DETECTED
HPIV2 SPEC QL CULT: NOT DETECTED
HPIV3 AB TITR SER CF: NOT DETECTED {TITER}
HPIV4 RNA SPEC QL NAA+PROBE: NOT DETECTED
LYMPHOCYTES # SPEC AUTO: 2.9 10^3/UL (ref 1.5–3.5)
LYMPHOCYTES NFR BLD AUTO: 43.1 %
M PNEUMO DNA SPEC QL NAA+PROBE: NOT DETECTED
MAGNESIUM SERPL-MCNC: 2 MG/DL (ref 1.7–2.8)
MCH RBC QN AUTO: 31.9 PG (ref 27–31)
MCHC RBC AUTO-ENTMCNC: 32.5 G/DL (ref 32–36)
MCV RBC AUTO: 97.9 FL (ref 80–94)
MONOCYTES # BLD AUTO: 0.8 10^3/UL (ref 0–1)
MONOCYTES NFR BLD AUTO: 11.8 %
NEUTROPHILS # BLD AUTO: 2.7 10^3/UL (ref 1.5–6.6)
NEUTROPHILS # SNV AUTO: 6.6 X10^3/UL (ref 4.8–10.8)
NEUTROPHILS NFR BLD AUTO: 40.6 %
NRBC # BLD AUTO: 0 /100WBC
NRBC # BLD AUTO: 0 X10^3/UL
PDW BLD AUTO: 11.5 FL (ref 7.4–11.4)
PHOSPHATE BLD-MCNC: 3.3 MG/DL (ref 2.5–4.6)
PLATELET # BLD: 140 10^3/UL (ref 130–450)
POTASSIUM SERPL-SCNC: 3.6 MMOL/L (ref 3.5–5)
RBC MAR: 3.83 10^6/UL (ref 4.7–6.1)
RSV RNA RESP QL NAA+PROBE: NOT DETECTED
RV+EV RNA SPEC QL NAA+PROBE: NOT DETECTED
SARS-COV-2 RNA PNL SPEC NAA+PROBE: NOT DETECTED
SODIUM SERPLBLD-SCNC: 140 MMOL/L (ref 135–145)
VALPROATE SERPL-SCNC: 68 UG/ML

## 2021-03-31 RX ADMIN — SODIUM CHLORIDE, PRESERVATIVE FREE SCH ML: 5 INJECTION INTRAVENOUS at 17:48

## 2021-03-31 RX ADMIN — LACTULOSE SCH GM: 10 SOLUTION ORAL at 21:42

## 2021-03-31 RX ADMIN — PRAVASTATIN SODIUM SCH MG: 40 TABLET ORAL at 21:42

## 2021-03-31 RX ADMIN — LACTULOSE SCH GM: 10 SOLUTION ORAL at 13:47

## 2021-03-31 RX ADMIN — DILTIAZEM HYDROCHLORIDE SCH: 120 CAPSULE, COATED, EXTENDED RELEASE ORAL at 21:40

## 2021-03-31 RX ADMIN — BUDESONIDE SCH MG: 0.5 SUSPENSION RESPIRATORY (INHALATION) at 20:06

## 2021-03-31 RX ADMIN — DILTIAZEM HYDROCHLORIDE SCH: 120 CAPSULE, COATED, EXTENDED RELEASE ORAL at 17:48

## 2021-03-31 RX ADMIN — LACTULOSE SCH GM: 10 SOLUTION ORAL at 05:55

## 2021-03-31 RX ADMIN — SODIUM CHLORIDE SCH MLS/HR: 9 INJECTION, SOLUTION INTRAVENOUS at 13:47

## 2021-03-31 RX ADMIN — ALBUTEROL SULFATE PRN MG: 2.5 SOLUTION RESPIRATORY (INHALATION) at 20:06

## 2021-03-31 RX ADMIN — SODIUM CHLORIDE, PRESERVATIVE FREE SCH ML: 5 INJECTION INTRAVENOUS at 08:01

## 2021-03-31 RX ADMIN — PRAVASTATIN SODIUM SCH: 40 TABLET ORAL at 04:44

## 2021-03-31 NOTE — PROVIDER PROGRESS NOTE
Subjective





- Prog Note Date


Prog Note Date: 03/31/21





- Subjective


Pt reports feeling: Improved


Subjective: 


Patient has no seizure, but patient's ammonia level is slightly elevated, 

Compared with yesterday evening. Patient is alert and orientated, patient is not

confused. I Called patient's neurologist Dr. Philippe in , report pt's 

conditions to him, he recommend pt's Depakote dosage can be decreased to 250mg 

bid from previous 250mg Qid. 








Current Medications





- Current Medications


Current Medications: 





Active Medications





Acetaminophen (Acetaminophen 325 Mg Tablet)  650 mg PO Q4HR PRN


   PRN Reason: Pain 1 to 4


Albuterol (Albuterol Neb 2.5 Mg/3 Ml)  2.5 mg INH RTQ4H PRN


   PRN Reason: Wheezing


Atenolol (Atenolol 25 Mg Tablet)  25 mg PO DAILY [facility]


   Last Admin: 03/31/21 08:07 Dose:  25 mg


   Documented by: 


Budesonide (Budesonide 0.5 Mg/2 Ml Neb)  0.5 mg INH RTBID [facility]


Sodium Chloride (Normal Saline 0.9%)  1,000 mls @ 100 mls/hr IV .Q10H [facility]


   Stop: 04/01/21 09:59


   Last Admin: 03/31/21 13:47 Dose:  100 mls/hr


   Documented by: 


Lactulose (Lactulose 10 Gm /15 Ml Udc)  20 gm PO TID [facility]


   Last Admin: 03/31/21 13:47 Dose:  20 gm


   Documented by: 


Lorazepam (Lorazepam 1 Mg Tablet)  1 mg PO BID [facility]


   Last Admin: 03/31/21 08:07 Dose:  1 mg


   Documented by: 


Ondansetron HCl (Ondansetron Odt 4 Mg Tablet)  4 mg TL Q6HR PRN


   PRN Reason: Nausea / Vomiting


Oxcarbazepine (Oxcarbazepine 150 Mg Tablet)  600 mg PO BID [facility]


   Last Admin: 03/31/21 08:07 Dose:  600 mg


   Documented by: 


Zonisamide [Zonegran (] 100 Mg Capsule)  1 each PO QID [facility]


Erampanel [Fycompa] (8 Mg)  1 each PO HS [facility]


Pravastatin Sodium (Pravastatin 40 Mg Tablet)  40 mg PO HS [facility]


   Last Admin: 03/31/21 04:44 Dose:  Not Given


   Documented by: 


Sodium Chloride (Sodium Chloride Flush 0.9% 10 Ml Syringe)  10 ml IVP PRN PRN


   PRN Reason: AS NEEDED PER PROVIDER ORDERS


   Last Admin: 03/31/21 13:48 Dose:  10 ml


   Documented by: 


Sodium Chloride (Sodium Chloride Flush 0.9% 10 Ml Syringe)  10 ml IVP 

0100,0900,1700 MARIA ESTHER


   Last Admin: 03/31/21 08:01 Dose:  10 ml


   Documented by: 





                                        





Albuterol Sulf [Ventolin Hfa Inhaler] 40 mg .ROUTE DAILY 03/30/21 


Atenolol [Tenormin] 25 mg PO DAILY 03/30/21 


Budesonide [Pulmicort Flexhaler] 80 mcg .ROUTE BID 03/30/21 


Divalproex ER [Depakote ER] 250 mg PO QID 03/30/21 


Lorazepam [Ativan] 1 mg PO BID 03/30/21 


OXcarbazepine [Trileptal] 600 mg PO BID 03/30/21 


Perampanel [Fycompa] 8 mg PO DAILY 03/30/21 


Pravastatin [Pravachol] 40 mg PO DAILY 03/30/21 


Umeclidinium Bromide [Incruse Ellipta] 62.5 mcg .ROUTE DAILY 03/30/21 


Zonisamide [Zonegran] 100 mg PO QID 03/30/21 











Objective





- Vital Signs/Intake & Output


Vital Signs: 


                                Vital Signs x48h











  Temp Pulse Resp BP Pulse Ox


 


 03/31/21 16:00  36.6 C  65  19  123/63  98











Intake & Output: 


                                 Intake & Output











 03/28/21 03/29/21 03/30/21 03/31/21





 23:59 23:59 23:59 23:59


 


Intake Total   1000 1361


 


Output Total    300


 


Balance   1000 1061














- Objective


General Appearance: positive: No acute distress, Alert.  negative: Lethargic


Eyes Bilateral: positive: Normal inspection, PERRL, No lid inflammation


ENT: positive: ENT inspection nml, No signs of dehydration.  negative: Purulent 

nasal drainage


Neck: positive: Nml inspection, Trachea midline.  negative: Thyromegaly, 

Tracheal deviation


Respiratory: positive: Chest non-tender, No respiratory distress.  negative: 

Wheezes, Rales


Cardiovascular: positive: Regular rate & rhythm, No murmur.  negative: 

Tachycardia, Bradycardia, Systolic murmur, Diastolic murmur


Peripheral Pulses: 2+ Radial (R), 2+ Radial (L)


Abdomen: positive: Non-tender, Nml bowel sounds, No distention.  negative: 

Tenderness


Back: positive: Nml inspection.  negative: CVA tenderness (R), CVA tenderness 

(L)


Skin: positive: Color nml, Warm, Dry.  negative: Cyanosis, Diaphoresis


Extremities: positive: Non-tender, Nml appearance.  negative: Calf tenderness


Neurologic/Psychiatric: positive: Oriented x3, Sensation nml, Mood/affect nml.  

negative: Weakness, Sensory loss, Facial droop, Slurred/abnml speech, Depressed 

mood/affect





- Lab Results


Fish Bones: 


                                 03/31/21 04:35





                                 03/31/21 04:35


Other Labs: 


                               Lab Results x24hrs











  03/31/21 03/31/21 03/31/21 Range/Units





  12:01 04:35 04:35 


 


WBC     (4.8-10.8)  x10^3/uL


 


RBC     (4.70-6.10)  10^6/uL


 


Hgb     (14.0-18.0)  g/dL


 


Hct     (42.0-52.0)  %


 


MCV     (80.0-94.0)  fL


 


MCH     (27.0-31.0)  pg


 


MCHC     (32.0-36.0)  g/dL


 


RDW     (12.0-15.0)  %


 


Plt Count     (130-450)  10^3/uL


 


MPV     (7.4-11.4)  fL


 


Neut # (Auto)     (1.5-6.6)  10^3/uL


 


Lymph # (Auto)     (1.5-3.5)  10^3/uL


 


Mono # (Auto)     (0.0-1.0)  10^3/uL


 


Eos # (Auto)     (0.0-0.7)  10^3/uL


 


Baso # (Auto)     (0.0-0.1)  10^3/uL


 


Absolute Nucleated RBC     x10^3/uL


 


Nucleated RBC %     /100WBC


 


Sodium     (135-145)  mmol/L


 


Potassium     (3.5-5.0)  mmol/L


 


Chloride     (101-111)  mmol/L


 


Carbon Dioxide     (21-32)  mmol/L


 


Anion Gap     (6-13)  


 


BUN     (6-20)  mg/dL


 


Creatinine     (0.6-1.2)  mg/dL


 


Estimated GFR (MDRD)     (>89)  


 


Glucose     ()  mg/dL


 


Calcium     (8.5-10.3)  mg/dL


 


Phosphorus     (2.5-4.6)  mg/dL


 


Magnesium     (1.7-2.8)  mg/dL


 


Ammonia  80.1 H*   72.9 H  (7-35)  umol/L


 


Nasal Adenovirus (PCR)     


 


Nasal B. parapertussis DNA (PCR)     


 


Nasal Coronavir 229E PCR     


 


Nasal Coronavir HKU1 PCR     


 


Nasal Coronavir NL63 PCR     


 


Nasal Coronavir OC43 PCR     


 


Nasal Enterovir/Rhinovir PCR     


 


Nasal Influenza B PCR     


 


Nasal Influenza A PCR     


 


Nasal Parainfluen 1 PCR     


 


Nasal Parainfluen 2 PCR     


 


Nasal Parainfluen 3 PCR     


 


Nasal Parainfluen 4 PCR     


 


Nasal RSV (PCR)     


 


Nasal B.pertussis DNA PCR     


 


Nasal C.pneumoniae (PCR)     


 


Robby Human Metapneumo PCR     


 


Nasal M.pneumoniae (PCR)     


 


Nasal SARS-CoV-2 (PCR)     


 


Last Dose Date   UNK   


 


Last Dose Time   UNK   


 


Valproic Acid   68.0   ug/mL














  03/31/21 03/31/21 03/30/21 Range/Units





  04:35 04:35 23:27 


 


WBC   6.6   (4.8-10.8)  x10^3/uL


 


RBC   3.83 L   (4.70-6.10)  10^6/uL


 


Hgb   12.2 L   (14.0-18.0)  g/dL


 


Hct   37.5 L   (42.0-52.0)  %


 


MCV   97.9 H   (80.0-94.0)  fL


 


MCH   31.9 H   (27.0-31.0)  pg


 


MCHC   32.5   (32.0-36.0)  g/dL


 


RDW   12.0   (12.0-15.0)  %


 


Plt Count   140   (130-450)  10^3/uL


 


MPV   11.5 H   (7.4-11.4)  fL


 


Neut # (Auto)   2.7   (1.5-6.6)  10^3/uL


 


Lymph # (Auto)   2.9   (1.5-3.5)  10^3/uL


 


Mono # (Auto)   0.8   (0.0-1.0)  10^3/uL


 


Eos # (Auto)   0.2   (0.0-0.7)  10^3/uL


 


Baso # (Auto)   0.0   (0.0-0.1)  10^3/uL


 


Absolute Nucleated RBC   0.00   x10^3/uL


 


Nucleated RBC %   0.0   /100WBC


 


Sodium  140    (135-145)  mmol/L


 


Potassium  3.6    (3.5-5.0)  mmol/L


 


Chloride  112 H    (101-111)  mmol/L


 


Carbon Dioxide  21    (21-32)  mmol/L


 


Anion Gap  7.0    (6-13)  


 


BUN  19    (6-20)  mg/dL


 


Creatinine  0.7    (0.6-1.2)  mg/dL


 


Estimated GFR (MDRD)  114    (>89)  


 


Glucose  95    ()  mg/dL


 


Calcium  8.3 L    (8.5-10.3)  mg/dL


 


Phosphorus  3.3    (2.5-4.6)  mg/dL


 


Magnesium  2.0    (1.7-2.8)  mg/dL


 


Ammonia     (7-35)  umol/L


 


Nasal Adenovirus (PCR)    NOT DETECTED  


 


Nasal B. parapertussis DNA (PCR)    NOT DETECTED  


 


Nasal Coronavir 229E PCR    NOT DETECTED  


 


Nasal Coronavir HKU1 PCR    NOT DETECTED  


 


Nasal Coronavir NL63 PCR    NOT DETECTED  


 


Nasal Coronavir OC43 PCR    NOT DETECTED  


 


Nasal Enterovir/Rhinovir PCR    NOT DETECTED  


 


Nasal Influenza B PCR    NOT DETECTED  


 


Nasal Influenza A PCR    NOT DETECTED  


 


Nasal Parainfluen 1 PCR    NOT DETECTED  


 


Nasal Parainfluen 2 PCR    NOT DETECTED  


 


Nasal Parainfluen 3 PCR    NOT DETECTED  


 


Nasal Parainfluen 4 PCR    NOT DETECTED  


 


Nasal RSV (PCR)    NOT DETECTED  


 


Nasal B.pertussis DNA PCR    NOT DETECTED  


 


Nasal C.pneumoniae (PCR)    NOT DETECTED  


 


Robby Human Metapneumo PCR    NOT DETECTED  


 


Nasal M.pneumoniae (PCR)    NOT DETECTED  


 


Nasal SARS-CoV-2 (PCR)    NOT DETECTED  


 


Last Dose Date     


 


Last Dose Time     


 


Valproic Acid     ug/mL














  03/30/21 03/30/21 03/30/21 Range/Units





  21:41 21:41 17:55 


 


WBC     (4.8-10.8)  x10^3/uL


 


RBC     (4.70-6.10)  10^6/uL


 


Hgb     (14.0-18.0)  g/dL


 


Hct     (42.0-52.0)  %


 


MCV     (80.0-94.0)  fL


 


MCH     (27.0-31.0)  pg


 


MCHC     (32.0-36.0)  g/dL


 


RDW     (12.0-15.0)  %


 


Plt Count     (130-450)  10^3/uL


 


MPV     (7.4-11.4)  fL


 


Neut # (Auto)     (1.5-6.6)  10^3/uL


 


Lymph # (Auto)     (1.5-3.5)  10^3/uL


 


Mono # (Auto)     (0.0-1.0)  10^3/uL


 


Eos # (Auto)     (0.0-0.7)  10^3/uL


 


Baso # (Auto)     (0.0-0.1)  10^3/uL


 


Absolute Nucleated RBC     x10^3/uL


 


Nucleated RBC %     /100WBC


 


Sodium   146 H   (135-145)  mmol/L


 


Potassium   4.2   (3.5-5.0)  mmol/L


 


Chloride   107   (101-111)  mmol/L


 


Carbon Dioxide   24   (21-32)  mmol/L


 


Anion Gap   15.0 H   (6-13)  


 


BUN   20   (6-20)  mg/dL


 


Creatinine   0.7   (0.6-1.2)  mg/dL


 


Estimated GFR (MDRD)   114   (>89)  


 


Glucose   109 H   ()  mg/dL


 


Calcium   9.5   (8.5-10.3)  mg/dL


 


Phosphorus     (2.5-4.6)  mg/dL


 


Magnesium     (1.7-2.8)  mg/dL


 


Ammonia  82.4 H*   67.0 H  (7-35)  umol/L


 


Nasal Adenovirus (PCR)     


 


Nasal B. parapertussis DNA (PCR)     


 


Nasal Coronavir 229E PCR     


 


Nasal Coronavir HKU1 PCR     


 


Nasal Coronavir NL63 PCR     


 


Nasal Coronavir OC43 PCR     


 


Nasal Enterovir/Rhinovir PCR     


 


Nasal Influenza B PCR     


 


Nasal Influenza A PCR     


 


Nasal Parainfluen 1 PCR     


 


Nasal Parainfluen 2 PCR     


 


Nasal Parainfluen 3 PCR     


 


Nasal Parainfluen 4 PCR     


 


Nasal RSV (PCR)     


 


Nasal B.pertussis DNA PCR     


 


Nasal C.pneumoniae (PCR)     


 


Robby Human Metapneumo PCR     


 


Nasal M.pneumoniae (PCR)     


 


Nasal SARS-CoV-2 (PCR)     


 


Last Dose Date   Not Reportable   


 


Last Dose Time   Not Reportable   


 


Valproic Acid   60.6   ug/mL














  03/30/21 03/30/21 Range/Units





  17:55 14:26 


 


WBC    (4.8-10.8)  x10^3/uL


 


RBC    (4.70-6.10)  10^6/uL


 


Hgb    (14.0-18.0)  g/dL


 


Hct    (42.0-52.0)  %


 


MCV    (80.0-94.0)  fL


 


MCH    (27.0-31.0)  pg


 


MCHC    (32.0-36.0)  g/dL


 


RDW    (12.0-15.0)  %


 


Plt Count    (130-450)  10^3/uL


 


MPV    (7.4-11.4)  fL


 


Neut # (Auto)    (1.5-6.6)  10^3/uL


 


Lymph # (Auto)    (1.5-3.5)  10^3/uL


 


Mono # (Auto)    (0.0-1.0)  10^3/uL


 


Eos # (Auto)    (0.0-0.7)  10^3/uL


 


Baso # (Auto)    (0.0-0.1)  10^3/uL


 


Absolute Nucleated RBC    x10^3/uL


 


Nucleated RBC %    /100WBC


 


Sodium  146 H   (135-145)  mmol/L


 


Potassium  4.1   (3.5-5.0)  mmol/L


 


Chloride  111   (101-111)  mmol/L


 


Carbon Dioxide  24   (21-32)  mmol/L


 


Anion Gap  11.0   (6-13)  


 


BUN  22 H   (6-20)  mg/dL


 


Creatinine  0.7   (0.6-1.2)  mg/dL


 


Estimated GFR (MDRD)  114   (>89)  


 


Glucose  105 H   ()  mg/dL


 


Calcium  9.7   (8.5-10.3)  mg/dL


 


Phosphorus    (2.5-4.6)  mg/dL


 


Magnesium    (1.7-2.8)  mg/dL


 


Ammonia    (7-35)  umol/L


 


Nasal Adenovirus (PCR)    


 


Nasal B. parapertussis DNA (PCR)    


 


Nasal Coronavir 229E PCR    


 


Nasal Coronavir HKU1 PCR    


 


Nasal Coronavir NL63 PCR    


 


Nasal Coronavir OC43 PCR    


 


Nasal Enterovir/Rhinovir PCR    


 


Nasal Influenza B PCR    


 


Nasal Influenza A PCR    


 


Nasal Parainfluen 1 PCR    


 


Nasal Parainfluen 2 PCR    


 


Nasal Parainfluen 3 PCR    


 


Nasal Parainfluen 4 PCR    


 


Nasal RSV (PCR)    


 


Nasal B.pertussis DNA PCR    


 


Nasal C.pneumoniae (PCR)    


 


Robby Human Metapneumo PCR    


 


Nasal M.pneumoniae (PCR)    


 


Nasal SARS-CoV-2 (PCR)    


 


Last Dose Date  UNKNOWN  Not Reportable  


 


Last Dose Time  UNKNOWN  Not Reportable  


 


Valproic Acid  72.0   ug/mL














ABX Reporting


Has patient been on IV antibiotics over the past 48 hours?: No





Sepsis Event Note (H)





- Evaluation


Current Stage of Sepsis: Ruled out





Assessment/Plan





- Problem List


(1) Encephalopathy


Impression: 





3/31 pt's ammonia level is slight elevated compared with in the morning one. but

 Patient is alert, orientated, pt has no seizure, patient acute encephalopathy 

is resolved. Called Dr. Philippe, Patient neurologist in , He recommend reduce 

Depakote dosage dosage from 250 mg 4 times daily to twice daily. We will c

ontinue monitor ammonia level, neuro checks








(2) Hyperammonemia


Conclusion/Plan: 


3/31 pt's ammonia level is slight elevated compared with in the morning one But 

patient's depakote was hold, Patient has normal liver function test, Patient is 

alert and orientated. increase lactulose dosage to 20mg tid, add iVF, Continue 

 





(3) Epilepsy


Conclusion/Plan: 


3/31 Stable, patient has no seizure. alled Dr. Philippe, Patient neurologist in 

, He recommend reduce Depakote dosage dosage from 250 mg 4 times daily to 

twice daily. We will continue monitor ammonia level, neuro checks





(4) Hypernatremia


resolved





(5) COPD (chronic obstructive pulmonary disease)


Stable and not in exacerbation.  We will continue his home inhalers and 

albuterol as needed.

## 2021-03-31 NOTE — PHARMACY PROGRESS NOTE
- Best Possible Medication History


Admit Date and Time: 03/30/21 5243


Processed by: Pharmacy


Medication History completed: Yes


Patient Interview: Completed (Pt interviewed by Alonso 3/31)


Secondary Source(s): Pharmacy records, Insurance records





As the person ultimately responsible for medication therapy, providers are able 

to order a medication from an existing home medication list in [facility] via the 

"Reconcile Routine" prior to Confirmation of that medication by support staff. 

Such practice is discouraged except when the physician, in their clinical 

judgment, deems that a medical need exists for a medication without regard to 

previous use.

## 2021-04-01 VITALS — SYSTOLIC BLOOD PRESSURE: 134 MMHG | DIASTOLIC BLOOD PRESSURE: 65 MMHG

## 2021-04-01 LAB
ANION GAP SERPL CALCULATED.4IONS-SCNC: 6 MMOL/L (ref 6–13)
BASOPHILS NFR BLD AUTO: 0 10^3/UL (ref 0–0.1)
BASOPHILS NFR BLD AUTO: 0.4 %
BUN SERPL-MCNC: 18 MG/DL (ref 6–20)
CALCIUM UR-MCNC: 7.8 MG/DL (ref 8.5–10.3)
CHLORIDE SERPL-SCNC: 116 MMOL/L (ref 101–111)
CO2 SERPL-SCNC: 19 MMOL/L (ref 21–32)
CREAT SERPLBLD-SCNC: 0.7 MG/DL (ref 0.6–1.2)
EOSINOPHIL # BLD AUTO: 0.2 10^3/UL (ref 0–0.7)
EOSINOPHIL NFR BLD AUTO: 2.3 %
ERYTHROCYTE [DISTWIDTH] IN BLOOD BY AUTOMATED COUNT: 12.2 % (ref 12–15)
GFRSERPLBLD MDRD-ARVRAT: 114 ML/MIN/{1.73_M2} (ref 89–?)
GLUCOSE SERPL-MCNC: 99 MG/DL (ref 70–100)
HCT VFR BLD AUTO: 37 % (ref 42–52)
HGB UR QL STRIP: 12 G/DL (ref 14–18)
LYMPHOCYTES # SPEC AUTO: 2.4 10^3/UL (ref 1.5–3.5)
LYMPHOCYTES NFR BLD AUTO: 34.9 %
MCH RBC QN AUTO: 31.6 PG (ref 27–31)
MCHC RBC AUTO-ENTMCNC: 32.4 G/DL (ref 32–36)
MCV RBC AUTO: 97.4 FL (ref 80–94)
MONOCYTES # BLD AUTO: 1 10^3/UL (ref 0–1)
MONOCYTES NFR BLD AUTO: 14.2 %
NEUTROPHILS # BLD AUTO: 3.3 10^3/UL (ref 1.5–6.6)
NEUTROPHILS # SNV AUTO: 6.8 X10^3/UL (ref 4.8–10.8)
NEUTROPHILS NFR BLD AUTO: 47.8 %
NRBC # BLD AUTO: 0 /100WBC
NRBC # BLD AUTO: 0 X10^3/UL
PDW BLD AUTO: 11.8 FL (ref 7.4–11.4)
PLATELET # BLD: 135 10^3/UL (ref 130–450)
POTASSIUM SERPL-SCNC: 4.1 MMOL/L (ref 3.5–5)
RBC MAR: 3.8 10^6/UL (ref 4.7–6.1)
SODIUM SERPLBLD-SCNC: 141 MMOL/L (ref 135–145)

## 2021-04-01 RX ADMIN — ALBUTEROL SULFATE PRN MG: 2.5 SOLUTION RESPIRATORY (INHALATION) at 07:19

## 2021-04-01 RX ADMIN — SODIUM CHLORIDE SCH MLS/HR: 9 INJECTION, SOLUTION INTRAVENOUS at 00:31

## 2021-04-01 RX ADMIN — SODIUM CHLORIDE, PRESERVATIVE FREE SCH: 5 INJECTION INTRAVENOUS at 01:30

## 2021-04-01 RX ADMIN — SODIUM CHLORIDE, PRESERVATIVE FREE SCH: 5 INJECTION INTRAVENOUS at 08:55

## 2021-04-01 RX ADMIN — DILTIAZEM HYDROCHLORIDE SCH: 120 CAPSULE, COATED, EXTENDED RELEASE ORAL at 11:01

## 2021-04-01 RX ADMIN — BUDESONIDE SCH MG: 0.5 SUSPENSION RESPIRATORY (INHALATION) at 07:19

## 2021-04-01 RX ADMIN — LACTULOSE SCH GM: 10 SOLUTION ORAL at 01:43

## 2021-04-01 NOTE — DISCHARGE PLAN
Discharge Plan


Problem Reviewed?: Yes


Disposition: 01 Home, Self Care


Condition: Stable


Prescriptions: 


Lactulose 15 ml PO TID #240 ml


Diet: Regular


Activity Restrictions: Activity as Tolerated


Shower Restrictions: No (fall precaution)


Instruction Topics:  Lactulose oral solution, Epilepsy Meds, Epilepsy Self Care,

Valproic Acid Divalproex Sodium capsules


Health Concerns: 


elevated ammonia level and dehydration


Plan of Treatment: 


Your ammonia level is controlled and you are oriented. You may keep hydration in

the home, and Lactulose is prescribed to help you. Your neurologist recommended 

to reduce your Depakote dosage to 250mg twice daily. Our  will help

you make an appointment and Your neurologist office will contact you, you may 

contact with your neurologist office to make appointment as well. You may resume

your home meds.


Care Goals: 


stabilization and improvement of your medical conditions.


Assessment: 


discussed the care plan with you, answered your questions, you understood.


Additional Instructions or Follow Up instructions: 


you may followup with your PCP in one to two weeks, followup with your 

neurologist as out-pt. Should your symptoms return or worsen, you may present ER

or call 911 for help.


No Smoking: If you smoke, Please STOP!  Call [phone] for help.


Follow-up with: 


Noemi Berman MD [Primary Care Provider] -

## 2021-04-01 NOTE — DISCHARGE SUMMARY
Discharge Summary


Admit Date: 03/30/21


Discharge Date: 04/01/21


Discharging Provider: Luis Angel Cameron


Primary Care Provider: Noemi Mello


Condition at Discharge: Stable


Discharge Disposition: 01 Home, Self Care


Discharge Facility Name: home





- DIAGNOSES


Discharge Diagnoses with Status of Each Condition: 





(1) Encephalopathy


Patient is alert and orientated as pt's baseline. 





(2) Hyperammonemia


Ammonia level is reduced from 80 to 51. Depakote has side effect to rise ammonia

level. Called pt's neurologist , he recommended to reduce Depakote 

dosage from qid 250mg to bid 250mg daily. pt is prescribed Lactulose which 

helped pt reduce ammonia level in the hospital. also advise pt keep hydration in

the home, since pt had hypernatremia in the admission. Pt may followup with pt's

neurologist for further management. Dr. Philippe office will call pt to make 

appointment,  also help pt make appointment. Pt may call his 

neurologist make appointment as well 





(3) Epilepsy


Stable. called Dr. Philippe, Patient neurologist in , reported pt's conditions, 

and discussed the care plan. He recommend to reduce Depakote dosage from 250 mg 

4 times daily to twice daily, followup with his office. resume pt's other 

seizure home meds.





(4) Hypernatremia


resolved. explained and educate pt and his caregiver at the bedside the 

importance to keep pt hydration at home. pt may followup with his PCP in one to 

two week to recheck sodium and ammonia level, continue the management.





(5) COPD (chronic obstructive pulmonary disease)


Stable, resume home meds








- [facility]


History of Present Illness: 


refer from Dr. Mills's HPI on 3/30/21


This is a 65-year-old male with a past medical history significant for epilepsy,

COPD who presents today from home as his sister was concerned that he was quite 

unsteady on his feet.  History is obtained from both the patient and his sister 

who is present at bedside.  The patient has unfortunately poorly controlled 

epilepsy with near daily seizures.  He is on multiple antiepileptics at home.  

His sister tells me that he is normally a little unsteady and can be 

intermittently confused and due to the antiepileptics as well as occasional 

episodes of being postictal.  He can also be mildly sedated at times it due to 

the Ativan he is on.  She states that this morning he was much more unsteady 

compared to usual and was not himself.  The patient admits to taking Percogesic 

and believes he only took 1 tablet last night.  He takes this once a week for 

ankle pain which is chronic for him.  His most recent seizure was today in the 

emergency department.  He reports that they are usually minor and predominately 

involve his head and right upper extremity.  He does not have grand mal 

seizures.  His neurologist is at [facility].  He was recently started on a new 

medication called Fycompa.  He has been on Depakote for many years.  He denies 

any prior history of liver disease.  He does not drink alcohol.  He denies 

passing out today.





In the emergency department, he was found to be afebrile with temperature of 

36.4 C.  His heart rate was in the 50s.  Blood pressure was 113/57.  He was not

tachypneic and saturating well on room air.  Labs were significant for his 

sodium of 147 and an ammonia level of 66.1.  Initial Depakote level is 82.6.  

These findings were discussed with poison control who recommended rechecking 

labs in 4 hours.  His Depakote level was trending down to 72 but his ammonia 

remained elevated at 67.  They recommended rechecking labs again in 4 hours to 

ensure that the ammonia begins to trend down.  This later increased to 82.4.  

Given the rise in the ammonia level, medicine was consulted for admission.





I did discuss goals of care the patient and he would like to be a full code.








- HOSPITAL COURSE


Hospital Course: 


Patient was admitted for encephalopathy. Patient had a medical history of 

seizure which was Difficulty to control, patient taken 5 seizure medication at 

Home. Patient was found elevated ammonia level and hypernatremia as well. 

Patient was treated intravenous IV fluids, and lactulose. After treatment 

patient's hypernatremia is resolved, ammonia level was significantly reduced. 

Discussed the care plan with patient's neurologist Dr. Philippe. He recommend 

reduce patient's home medication Depakote to 250 mg twice daily from 250 mg 4 

times daily. After treatment, patient became alert, orientated as his baseline. 








- ALLERGIES


Allergies/Adverse Reactions: 


                                    Allergies











Allergy/AdvReac Type Severity Reaction Status Date / Time


 


Iodinated Contrast Media Allergy  Rash Verified 03/30/21 13:02














- MEDICATIONS


Home Medications: 


                                Ambulatory Orders











 Medication  Instructions  Recorded  Confirmed


 


Albuterol Sulf [Ventolin Hfa 40 mg .ROUTE DAILY 03/30/21 03/31/21





Inhaler]   


 


Atenolol [Tenormin] 25 mg PO DAILY 03/30/21 03/31/21


 


Budesonide [Pulmicort Flexhaler] 80 mcg .ROUTE BID 03/30/21 03/31/21


 


Lorazepam [Ativan] 1 mg PO BID 03/30/21 03/31/21


 


OXcarbazepine [Trileptal] 600 mg PO BID 03/30/21 03/31/21


 


Perampanel [Fycompa] 8 mg PO DAILY 03/30/21 03/31/21


 


Pravastatin [Pravachol] 40 mg PO DAILY 03/30/21 03/31/21


 


Umeclidinium Bromide [Incruse 62.5 mcg .ROUTE DAILY 03/30/21 03/31/21





Ellipta]   


 


Zonisamide [Zonegran] 100 mg PO QID 03/30/21 03/31/21


 


Divalproex ER [Depakote ER] 250 mg PO BID #60 tab 04/01/21 


 


Lactulose 15 ml PO TID #240 ml 04/01/21 














- PHYSICAL EXAM AT DISCHARGE


General Appearance: positive: No acute distress, Alert.  negative: Lethargic


Eyes Bilateral: positive: Normal inspection, PERRL, No lid inflammation


ENT: positive: ENT inspection nml, No signs of dehydration.  negative: Purulent 

nasal drainage


Neck: positive: Nml inspection, Trachea midline.  negative: Thyromegaly, 

Tracheal deviation


Respiratory: positive: Chest non-tender, Breath sounds nml.  negative: Wheezes


Cardiovascular: positive: Regular rate & rhythm, No murmur.  negative: 

Tachycardia, Bradycardia, Systolic murmur, Diastolic murmur


Peripheral Pulses: positive: 2+


Abdomen: positive: Non-tender, Nml bowel sounds, No distention.  negative: 

Tenderness


Back: positive: Nml inspection


Skin: positive: Color nml, Warm, Dry.  negative: Cyanosis, Diaphoresis, Pallor


Extremities: positive: Non-tender, Full ROM, Nml appearance.  negative: Calf 

tenderness


Neurologic/Psychiatric: positive: Oriented x3, Motor nml, Sensation nml, 

Mood/affect nml.  negative: Weakness, Sensory loss, Facial droop, Slurred/abnml 

speech





- LABS


Result Diagrams: 


                                 04/01/21 04:35





                                 04/01/21 04:35





- SEPSIS


Current Stage of Sepsis: Ruled out





- FOLLOW UP


Follow Up: 


Your ammonia level is controlled and you are oriented. You may keep hydration in

the home, and Lactulose is prescribed to help you. Your neurologist recommended 

to reduce your Depakote dosage to 250mg twice daily. Our  will help

you make an appointment and Your neurologist office will contact you, you may 

contact with your neurologist office to make appointment as well. You may resume

your home meds.


you may followup with your PCP in one to two weeks, followup with your 

neurologist as out-pt. Should your symptoms return or worsen, you may present ER

or call 911 for help.








- TIME SPENT


Time Spent in Discharge (Minutes): 30

## 2021-04-06 NOTE — EXTERNAL MEDICAL SUMMARY RPT
Continuity of Care Document

                            Created on:2021



Patient:LIZETT SWETHA NICOLE

Sex:Male

:1955

External Reference #:[id number]





Demographics







                          Phone                     Unavailable

 

                          Preferred Language        Unknown

 

                          Marital Status            Unknown

 

                          Restoration Affiliation     Unknown

 

                          Race                      Unknown

 

                          Ethnic Group              Unknown









Author







                          Organization              [facility]

 

                          [place]                    [place] [address]

 

                          Phone                     [phone]









Social History







                     date                description         facility

 

                     [id number]

## 2021-04-12 ENCOUNTER — HOSPITAL ENCOUNTER (OUTPATIENT)
Dept: HOSPITAL 76 - LAB | Age: 66
Discharge: HOME | End: 2021-04-12
Attending: INTERNAL MEDICINE
Payer: MEDICARE

## 2021-04-12 DIAGNOSIS — K72.90: Primary | ICD-10-CM

## 2021-04-12 PROCEDURE — 82140 ASSAY OF AMMONIA: CPT

## 2021-04-12 PROCEDURE — 36415 COLL VENOUS BLD VENIPUNCTURE: CPT

## 2021-05-27 ENCOUNTER — HOSPITAL ENCOUNTER (EMERGENCY)
Dept: HOSPITAL 76 - ED | Age: 66
Discharge: HOME | End: 2021-05-27
Payer: MEDICARE

## 2021-05-27 ENCOUNTER — HOSPITAL ENCOUNTER (OUTPATIENT)
Dept: HOSPITAL 76 - EMS | Age: 66
Discharge: ANOTHER HOSPITAL | End: 2021-05-27
Payer: MEDICARE

## 2021-05-27 VITALS — SYSTOLIC BLOOD PRESSURE: 129 MMHG | DIASTOLIC BLOOD PRESSURE: 80 MMHG

## 2021-05-27 DIAGNOSIS — F17.200: ICD-10-CM

## 2021-05-27 DIAGNOSIS — M79.602: ICD-10-CM

## 2021-05-27 DIAGNOSIS — R07.9: Primary | ICD-10-CM

## 2021-05-27 DIAGNOSIS — T85.840A: Primary | ICD-10-CM

## 2021-05-27 DIAGNOSIS — G40.909: ICD-10-CM

## 2021-05-27 LAB
ALBUMIN DIAFP-MCNC: 3.7 G/DL (ref 3.2–5.5)
ALBUMIN/GLOB SERPL: 1.1 {RATIO} (ref 1–2.2)
ALP SERPL-CCNC: 59 IU/L (ref 42–121)
ALT SERPL W P-5'-P-CCNC: 18 IU/L (ref 10–60)
ANION GAP SERPL CALCULATED.4IONS-SCNC: 4 MMOL/L (ref 6–13)
AST SERPL W P-5'-P-CCNC: 19 IU/L (ref 10–42)
BASOPHILS NFR BLD AUTO: 0 10^3/UL (ref 0–0.1)
BASOPHILS NFR BLD AUTO: 0.5 %
BILIRUB BLD-MCNC: 0.5 MG/DL (ref 0.2–1)
BUN SERPL-MCNC: 18 MG/DL (ref 6–20)
CALCIUM UR-MCNC: 9.2 MG/DL (ref 8.5–10.3)
CHLORIDE SERPL-SCNC: 112 MMOL/L (ref 101–111)
CO2 SERPL-SCNC: 24 MMOL/L (ref 21–32)
CREAT SERPLBLD-SCNC: 0.7 MG/DL (ref 0.6–1.2)
EOSINOPHIL # BLD AUTO: 0.2 10^3/UL (ref 0–0.7)
EOSINOPHIL NFR BLD AUTO: 2.3 %
ERYTHROCYTE [DISTWIDTH] IN BLOOD BY AUTOMATED COUNT: 11.9 % (ref 12–15)
GFRSERPLBLD MDRD-ARVRAT: 114 ML/MIN/{1.73_M2} (ref 89–?)
GLOBULIN SER-MCNC: 3.3 G/DL (ref 2.1–4.2)
GLUCOSE SERPL-MCNC: 98 MG/DL (ref 70–100)
HCT VFR BLD AUTO: 38.4 % (ref 42–52)
HGB UR QL STRIP: 12.5 G/DL (ref 14–18)
LIPASE SERPL-CCNC: 46 U/L (ref 22–51)
LYMPHOCYTES # SPEC AUTO: 2.1 10^3/UL (ref 1.5–3.5)
LYMPHOCYTES NFR BLD AUTO: 29 %
MCH RBC QN AUTO: 31.3 PG (ref 27–31)
MCHC RBC AUTO-ENTMCNC: 32.6 G/DL (ref 32–36)
MCV RBC AUTO: 96.2 FL (ref 80–94)
MONOCYTES # BLD AUTO: 0.9 10^3/UL (ref 0–1)
MONOCYTES NFR BLD AUTO: 12.7 %
NEUTROPHILS # BLD AUTO: 4.1 10^3/UL (ref 1.5–6.6)
NEUTROPHILS # SNV AUTO: 7.4 X10^3/UL (ref 4.8–10.8)
NEUTROPHILS NFR BLD AUTO: 55.2 %
NRBC # BLD AUTO: 0 /100WBC
NRBC # BLD AUTO: 0 X10^3/UL
PDW BLD AUTO: 10.2 FL (ref 7.4–11.4)
PLATELET # BLD: 194 10^3/UL (ref 130–450)
POTASSIUM SERPL-SCNC: 4.3 MMOL/L (ref 3.5–5)
PROT SPEC-MCNC: 7 G/DL (ref 6.7–8.2)
RBC MAR: 3.99 10^6/UL (ref 4.7–6.1)
SODIUM SERPLBLD-SCNC: 140 MMOL/L (ref 135–145)

## 2021-05-27 PROCEDURE — 80053 COMPREHEN METABOLIC PANEL: CPT

## 2021-05-27 PROCEDURE — 99284 EMERGENCY DEPT VISIT MOD MDM: CPT

## 2021-05-27 PROCEDURE — 36415 COLL VENOUS BLD VENIPUNCTURE: CPT

## 2021-05-27 PROCEDURE — 71045 X-RAY EXAM CHEST 1 VIEW: CPT

## 2021-05-27 PROCEDURE — 85025 COMPLETE CBC W/AUTO DIFF WBC: CPT

## 2021-05-27 PROCEDURE — 83690 ASSAY OF LIPASE: CPT

## 2021-05-27 PROCEDURE — 84484 ASSAY OF TROPONIN QUANT: CPT

## 2021-05-27 NOTE — ED PHYSICIAN DOCUMENTATION
PD HPI CHEST PAIN





- Stated complaint


Stated Complaint: CP





- History obtained from


History obtained from: Patient





- History of Present Illness


Timing - onset: How many weeks ago (1)


Timing - onset during: Light activity


Timing - duration: Weeks (1)


Timing - details: Gradual onset, Still present


Quality: Sharp, Pain


Location: Left chest


Radiation: No: Jaw, Neck, Back, Abdominal, Left upper extremity, Right upper 

extremity


Improved by: Rest, Other medication (ibuprofen)


Worsened by: Inspiration, Movement, Palpation


Associated symptoms: No: Shortness of air, Diaphoresis, Nausea, Vomiting, 

Feeling faint / dizzy, General Weakness, Palpitations, Cough


Similar symptoms before: Has not had sx before


Recently seen: Not recently seen





- Additional information


Additional information: 





65-year-old male has had a vagus nerve stimulator in place for seizure control 

and he has now developed some pain over the generator box.  He does not have any

inflammation over that he has tenderness to palpate it he has pain when he takes

deep breath and he has been getting relief with the use of ibuprofen.





Review of Systems


Constitutional: denies: Fever


Eyes: denies: Decreased vision


Ears: denies: Ear pain


Nose: denies: Congestion


Throat: denies: Sore throat


Cardiac: reports: Chest pain / pressure.  denies: Palpitations


Respiratory: denies: Dyspnea, Cough


GI: denies: Abdominal Pain, Nausea, Vomiting


: denies: Dysuria, Frequency





PD PAST MEDICAL HISTORY





- Past Medical History


Respiratory: COPD


Neuro: Seizure disorder





- Past Surgical History


Past Surgical History: Yes


Neuro: Craniotomy





- Present Medications


Home Medications: 


                                Ambulatory Orders











 Medication  Instructions  Recorded  Confirmed


 


Albuterol Sulf [Ventolin Hfa 40 mg .ROUTE DAILY 03/30/21 05/27/21





Inhaler]   


 


Budesonide [Pulmicort Flexhaler] 80 mcg .ROUTE BID 03/30/21 05/27/21


 


Lorazepam [Ativan] 1 mg PO BID 03/30/21 05/27/21


 


OXcarbazepine [Trileptal] 300 mg PO TID 03/30/21 05/27/21


 


Perampanel [Fycompa] 12 mg PO DAILY 03/30/21 05/27/21


 


Pravastatin [Pravachol] 40 mg PO DAILY 03/30/21 05/27/21


 


Zonisamide [Zonegran] 200 mg PO BID 03/30/21 05/27/21


 


Divalproex ER [Depakote ER] 250 mg PO DAILY 05/27/21 05/27/21


 


Multivitamin 1 tab PO DAILY 05/27/21 05/27/21














- Allergies


Allergies/Adverse Reactions: 


                                    Allergies











Allergy/AdvReac Type Severity Reaction Status Date / Time


 


Iodinated Contrast Media Allergy  Rash Verified 05/27/21 10:19














- Social History


Does the pt smoke?: Yes


Smoking Status: Current every day smoker


Does the pt drink ETOH?: No


Does the pt have substance abuse?: Yes





- Immunizations


Immunizations are current?: Yes





- POLST


Patient has POLST: No





PD ED PE NORMAL





- Vitals


Vital signs reviewed: Yes





- General


General: Alert and oriented X 3, No acute distress, Well developed/nourished





- HEENT


HEENT: Atraumatic, PERRL, EOMI





- Neck


Neck: Supple, no meningeal sign, No bony TTP





- Cardiac


Cardiac: RRR, No murmur





- Respiratory


Respiratory: No respiratory distress, Clear bilaterally, Other (There is a 

generator box under a well-healed scar that has some mild tenderness without 

swelling or erythema.  This is on the left anterior chest and palpation of this 

reproduces his pain the patient is experiencing.)





- Abdomen


Abdomen: Soft, Non tender





- Back


Back: No CVA TTP, No spinal TTP





- Derm


Derm: Normal color, Warm and dry, No rash





- Extremities


Extremities: No deformity, No edema





- Neuro


Neuro: Alert and oriented X 3, CNs 2-12 intact, No motor deficit, No sensory 

deficit, Normal speech


Eye Opening: Spontaneous


Motor: Obeys Commands


Verbal: Oriented


GCS Score: 15





- Psych


Psych: Normal mood, Normal affect





Results





- Vitals


Vitals: 


                               Vital Signs - 24 hr











  05/27/21 05/27/21





  10:16 11:17


 


Temperature 36.2 C L 36.6 C


 


Heart Rate 71 72


 


Respiratory 16 16





Rate  


 


Blood Pressure 130/79 129/80


 


O2 Saturation 97 98








                                     Oxygen











O2 Source                      Room air

















- Labs


Labs: 


                                Laboratory Tests











  05/27/21 05/27/21 05/27/21





  10:26 10:26 10:26


 


WBC  7.4  


 


RBC  3.99 L  


 


Hgb  12.5 L  


 


Hct  38.4 L  


 


MCV  96.2 H  


 


MCH  31.3 H  


 


MCHC  32.6  


 


RDW  11.9 L  


 


Plt Count  194  


 


MPV  10.2  


 


Neut # (Auto)  4.1  


 


Lymph # (Auto)  2.1  


 


Mono # (Auto)  0.9  


 


Eos # (Auto)  0.2  


 


Baso # (Auto)  0.0  


 


Absolute Nucleated RBC  0.00  


 


Nucleated RBC %  0.0  


 


Sodium   140 


 


Potassium   4.3 


 


Chloride   112 H 


 


Carbon Dioxide   24 


 


Anion Gap   4.0 L 


 


BUN   18 


 


Creatinine   0.7 


 


Estimated GFR (MDRD)   114 


 


Glucose   98 


 


Calcium   9.2 


 


Total Bilirubin   0.5 


 


AST   19 


 


ALT   18 


 


Alkaline Phosphatase   59 


 


Troponin I High Sens    3.0


 


Total Protein   7.0 


 


Albumin   3.7 


 


Globulin   3.3 


 


Albumin/Globulin Ratio   1.1 


 


Lipase   46 














- Rads (name of study)


  ** chest


Radiology: Prelim report reviewed (No acute cardiopulmonary process demonstrated

radiographically.), EMP read indepedently, See rad report





PD MEDICAL DECISION MAKING





- ED course


Complexity details: reviewed results, re-evaluated patient, considered 

differential, d/w patient


ED course: 





65-year-old male with a vagus nerve stimulator in his chest has chest pain that 

is associated with the nerve impulse generator itself.  There does not appear to

be any swelling or erythema associated with this but the compression of the 

device in the area around the device does reproduce the pain the patient is 

having.  He is administered dexamethasone here in the emergency department.





Departure





- Departure


Disposition: 01 Home, Self Care


Clinical Impression: 


 Chest wall pain





Condition: Stable


Instructions:  ED Chest Pain Costochondritis, ED Strain Chest Wall


Follow-Up: 


Noemi Berman MD [Primary Care Provider] - 


Discharge Date/Time: 05/27/21 11:18

## 2021-05-27 NOTE — EXTERNAL MEDICAL SUMMARY RPT
Continuity of Care Document

                             Created on:May 27, 2021



Patient:LIZETTSWETHA

Sex:Male

:1955

External Reference #:[id number]





Demographics







                          Phone                     Unavailable

 

                          Preferred Language        Unknown

 

                          Marital Status            Unknown

 

                          Baptism Affiliation     Unknown

 

                          Race                      Unknown

 

                          Ethnic Group              Unknown









Author







                          Organization              [facility]

 

                          Address                    [place] [id number]

 

                          Phone                     [phone]









Care Team Providers







                    Name                Role                Phone

 

                    ARNP                Unavailable         Unavailable









Allergies





Encounters





Medications







                     date                description         facility

 

                     2021            BUDESONIDE          All

 

                     [id number]            PRAVASTATIN SODIUM  All

 

                     [id number]            ZONISAMIDE          All

 

                     [id number]            LORAZEPAM           All

 

                     [id number]            UMECLIDINIUM BROMIDE  All

 

                     [id number]            ALBUTEROL SULFATE AERS  All

 

                     [id number]            PERAMPANEL          All

 

                     [id number]            DIVALPROEX SODIUM   All

 

                     [id number]            PERAMPANEL          All

 

                     [id number]            ZONISAMIDE          All

 

                     [id number]            LORAZEPAM           All

 

                     [id number]            DIVALPROEX SODIUM   All

 

                     [id number]            BUDESONIDE          All

 

                     [id number]            UMECLIDINIUM BROMIDE  All

 

                     [id number]            PRAVASTATIN SODIUM  All

 

                     [id number]            ALBUTEROL SULFATE AERS  All







Problems







                     date                description         facility

 

                     2021            Chest pain, unspecified  All

 

                     [id number]            Acute chest pain    All







Procedures







                     date                description         facility

 

                     2021            EKG Office Complete  All







Results





Vital Signs







                 date            measurement     value           source

 

                 2021        respiration_rate  18              /min

 

                 2021        heart_rate      69              /min

 

                 2021        BP_systolic     131             mm[Hg]

 

                 [id number]        BP_diastolic    71              mm[Hg] poor

## 2021-05-27 NOTE — XRAY REPORT
PROCEDURE:  Chest 1 View X-Ray

 

INDICATIONS:  Chest pain

 

TECHNIQUE:  One view of the chest was acquired.  

 

COMPARISON:  None

 

FINDINGS:  

 

Surgical changes and devices: Left chest wall pulse generator, presumably a component of a cardiac pa
cing device, although no cardiac leads are identified (potentially indicating either prior removal or
 the presence of radiolucent leads).

 

Lungs and pleura:  No pleural effusions or pneumothorax.  Lungs are clear.  

 

Mediastinum:  Mediastinal contours appear normal.  Heart size is normal.  

 

Bones and chest wall:  No suspicious bony lesions.  Overlying soft tissues appear unremarkable.  

 

IMPRESSION:  

No acute cardiopulmonary process demonstrated radiographically.

 

Reviewed by: Wayne Taylor MD on 5/27/2021 10:29 AM PDT

Approved by: Wayne Taylor MD on 5/27/2021 10:29 AM PDT

 

 

Station ID:  [id number]

## 2021-06-27 ENCOUNTER — HOSPITAL ENCOUNTER (EMERGENCY)
Dept: HOSPITAL 76 - ED | Age: 66
Discharge: HOME | End: 2021-06-27
Payer: MEDICARE

## 2021-06-27 VITALS — SYSTOLIC BLOOD PRESSURE: 111 MMHG | DIASTOLIC BLOOD PRESSURE: 58 MMHG
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
## 2021-06-27 DIAGNOSIS — F17.200: ICD-10-CM

## 2021-06-27 DIAGNOSIS — G40.909: ICD-10-CM

## 2021-06-27 DIAGNOSIS — S01.01XA: Primary | ICD-10-CM

## 2021-06-27 PROCEDURE — 96372 THER/PROPH/DIAG INJ SC/IM: CPT

## 2021-06-27 PROCEDURE — 99284 EMERGENCY DEPT VISIT MOD MDM: CPT

## 2021-06-27 PROCEDURE — 12001 RPR S/N/AX/GEN/TRNK 2.5CM/<: CPT

## 2021-06-27 NOTE — ED PHYSICIAN DOCUMENTATION
PD HPI SEIZURE





- Stated complaint


Stated Complaint: SZ/GLF/HEAD LAC





- Chief complaint


Chief Complaint: Laceration





- History obtained from


History obtained from: Patient





- History of Present Illness


Timing - onset: Today


Witnessed: Witnessed


Number of seizures: Single (Has a history of's generalized and petit mall 

seizures.  He states he has some often.  Had a seizure today which caused him to

fall and hit his head with a laceration.), Lasted - seconds


Description of seizure activity: Generalized


Injury during seizure: Fell, Head injury (scalp lac upper occipital area)


Associated symptoms: Headache (just locally at injury site; no generalized 

headache.)


History of seizures: Known seizure disorder


Contributing factors: No: Off meds, Changed meds, Fever


Similar symptoms before: Diagnosis (seizures frequently despite meds - every few

weeks general; petit mal almost daily.)


Recently seen: Not recently seen





Review of Systems


Constitutional: denies: Fever, Chills


Nose: denies: Rhinorrhea / runny nose, Congestion


Throat: denies: Sore throat


Respiratory: denies: Cough


GI: denies: Nausea, Vomiting, Diarrhea


Skin: reports: Laceration (s) (scalp today)


Musculoskeletal: denies: Neck pain, Back pain


Neurologic: denies: Focal weakness, Altered mental status





PD PAST MEDICAL HISTORY





- Past Medical History


Respiratory: COPD


Neuro: Seizure disorder





- Past Surgical History


Past Surgical History: Yes


Neuro: Craniotomy





- Present Medications


Home Medications: 


                                Ambulatory Orders











 Medication  Instructions  Recorded  Confirmed


 


Albuterol Sulf [Ventolin Hfa 40 mg .ROUTE DAILY 03/30/21 05/27/21





Inhaler]   


 


Budesonide [Pulmicort Flexhaler] 80 mcg .ROUTE BID 03/30/21 05/27/21


 


Lorazepam [Ativan] 1 mg PO BID 03/30/21 05/27/21


 


OXcarbazepine [Trileptal] 300 mg PO TID 03/30/21 05/27/21


 


Perampanel [Fycompa] 12 mg PO DAILY 03/30/21 05/27/21


 


Pravastatin [Pravachol] 40 mg PO DAILY 03/30/21 05/27/21


 


Zonisamide [Zonegran] 200 mg PO BID 03/30/21 05/27/21


 


Divalproex ER [Depakote ER] 250 mg PO DAILY 05/27/21 05/27/21


 


Multivitamin 1 tab PO DAILY 05/27/21 05/27/21














- Allergies


Allergies/Adverse Reactions: 


                                    Allergies











Allergy/AdvReac Type Severity Reaction Status Date / Time


 


Iodinated Contrast Media Allergy  Rash Verified 06/27/21 19:21














- Social History


Does the pt smoke?: Yes


Smoking Status: Current every day smoker


Does the pt drink ETOH?: No


Does the pt have substance abuse?: Yes





- Immunizations


Immunizations are current?: Yes





- POLST


Patient has POLST: No





PD ED PE NORMAL





- Vitals


Vital signs reviewed: Yes





- General


General: Alert and oriented X 3, No acute distress, Well developed/nourished





- HEENT


HEENT: PERRL, EOMI, Moist mucous membranes, Pharynx benign, Other (Occipital 

area with a 1.5 cm laceration full-thickness to the fatty layer.  Mild bleeding 

at this time.  No foreign bodies noted.)





- Neck


Neck: Supple, no meningeal sign, No bony TTP, No adenopathy





- Cardiac


Cardiac: RRR, No murmur





- Respiratory


Respiratory: Clear bilaterally





- Abdomen


Abdomen: Soft, Non tender





- Back


Back: No spinal TTP





- Derm


Derm: Normal color, Warm and dry





- Extremities


Extremities: Normal ROM s pain





- Neuro


Neuro: Alert and oriented X 3, CNs 2-12 intact, No motor deficit, No sensory 

deficit, Normal speech, Other


Eye Opening: Spontaneous


Motor: Obeys Commands


Verbal: Oriented


GCS Score: 15





- Psych


Psych: Normal mood, Normal affect





Results





- Vitals


Vitals: 


                               Vital Signs - 24 hr











  06/27/21 06/27/21 06/27/21





  19:18 19:30 21:01


 


Temperature 36.3 C L  


 


Heart Rate 79 78 70


 


Respiratory 16 20 16





Rate   


 


Blood Pressure 111/62 121/62 111/58 L


 


O2 Saturation 95 96 95








                                     Oxygen











O2 Source                      Room air

















Procedures





- Laceration (location)


  ** occipital scalp


Length in cm: 1.5


Wound type: Linear, Into subcut fat, Clean


Neurovascular status: Sensory intact


Anesthesia: Lidocaine 1%, With bicarb


Wound preparation: Irrigated copiously NS, Wound explored, To the base.  No: FB 

identified


Skin layer closure: Staples, Sutures - enter # ([id number])





PD MEDICAL DECISION MAKING





- ED course


Complexity details: re-evaluated patient (still seems okay and headache much 

improved with local anesth and toradol. ), considered differential (History of 

recurrent seizures and is on multiple medications.  No recent illness.  Main 

complaint is scalp laceration otherwise has frequent seizures at home.), d/w 

patient





Departure





- Departure


Disposition: 01 Home, Self Care


Clinical Impression: 


 Recurrent seizures, Fall due to seizure





Scalp laceration


Qualifiers:


 Encounter type: initial encounter Qualified Code(s): S01.01XA - Laceration 

without foreign body of scalp, initial encounter





Condition: Stable


Record reviewed to determine appropriate education?: Yes


Instructions:  ED Laceration Scalp Stitch Or Stap


Follow-Up: 


Noemi Berman MD [Primary Care Provider] - 


Comments: 


Well-hydrated.  Continue usual medications.  Tylenol or ibuprofen as needed for 

pains.





My suture care instructions: it is okay to wash and shower. Clean off the wound 

twice a day with soap and water, or peroxide and water. Apply some antibiotic 

ointment to it to keep it moist. Also to watch for signs of infection such as 

purulence, redness or increasing pain. Return to your primary care or the ER at 

the specified time for suture removal.





Staple removal 8 to 10 days.


Discharge Date/Time: 06/27/21 21:08

## 2022-04-15 ENCOUNTER — HOSPITAL ENCOUNTER (OUTPATIENT)
Dept: HOSPITAL 76 - DI | Age: 67
Discharge: HOME | End: 2022-04-15
Attending: INTERNAL MEDICINE
Payer: MEDICARE

## 2022-04-15 DIAGNOSIS — R93.89: ICD-10-CM

## 2022-04-15 DIAGNOSIS — R93.6: ICD-10-CM

## 2022-04-15 DIAGNOSIS — M25.522: Primary | ICD-10-CM

## 2022-04-15 NOTE — XRAY REPORT
PROCEDURE:  Elbow 3 View LT

 

INDICATIONS:  L ELBOW PAIN AFTER TRAUMA

 

TECHNIQUE:  3 views of the elbow were acquired.  

 

COMPARISON:  None.

 

 

FINDINGS:

BONES/JOINT: No acute, displaced fracture or dislocation. No appreciable joint effusion.

 

SOFT TISSUES: Soft tissue prominence overlying the olecranon, which may reflect bursitis.

 

IMPRESSION:  

1.No acute osseous abnormality of the elbow.

2.Soft tissue prominence overlying the olecranon, which may reflect bursitis.

 

   

 

 

Reviewed by: Chris Vargas MD on 4/15/2022 10:19 AM PDT

Approved by: Chris Vargas MD on 4/15/2022 10:19 AM PDT

 

 

Station ID:  [id number]

## 2022-10-26 ENCOUNTER — HOSPITAL ENCOUNTER (OUTPATIENT)
Dept: HOSPITAL 76 - LAB.R | Age: 67
Discharge: HOME | End: 2022-10-26
Attending: INTERNAL MEDICINE
Payer: MEDICARE

## 2022-10-26 DIAGNOSIS — Z72.0: ICD-10-CM

## 2022-10-26 DIAGNOSIS — L40.9: ICD-10-CM

## 2022-10-26 DIAGNOSIS — Z00.00: Primary | ICD-10-CM

## 2022-10-26 DIAGNOSIS — Z82.49: ICD-10-CM

## 2022-10-26 DIAGNOSIS — Z86.010: ICD-10-CM

## 2022-10-26 DIAGNOSIS — Z12.5: ICD-10-CM

## 2022-10-26 DIAGNOSIS — R56.9: ICD-10-CM

## 2022-10-26 DIAGNOSIS — E78.5: ICD-10-CM

## 2022-10-26 DIAGNOSIS — K72.90: ICD-10-CM

## 2022-10-26 DIAGNOSIS — R07.9: ICD-10-CM

## 2022-10-26 DIAGNOSIS — R06.09: ICD-10-CM

## 2022-10-26 LAB
ALBUMIN DIAFP-MCNC: 4 G/DL (ref 3.2–5.5)
ALBUMIN/GLOB SERPL: 1.4 {RATIO} (ref 1–2.2)
ALP SERPL-CCNC: 58 IU/L (ref 42–121)
ALT SERPL W P-5'-P-CCNC: 24 IU/L (ref 10–60)
ANION GAP SERPL CALCULATED.4IONS-SCNC: 9 MMOL/L (ref 6–13)
AST SERPL W P-5'-P-CCNC: 24 IU/L (ref 10–42)
BASOPHILS NFR BLD AUTO: 0.1 10^3/UL (ref 0–0.1)
BASOPHILS NFR BLD AUTO: 0.6 %
BILIRUB BLD-MCNC: 0.4 MG/DL (ref 0.2–1)
BUN SERPL-MCNC: 23 MG/DL (ref 6–20)
CALCIUM UR-MCNC: 9 MG/DL (ref 8.5–10.3)
CHLORIDE SERPL-SCNC: 111 MMOL/L (ref 101–111)
CHOLEST SERPL-MCNC: 186 MG/DL
CO2 SERPL-SCNC: 23 MMOL/L (ref 21–32)
CREAT SERPLBLD-SCNC: 0.7 MG/DL (ref 0.6–1.2)
EOSINOPHIL # BLD AUTO: 0.2 10^3/UL (ref 0–0.7)
EOSINOPHIL NFR BLD AUTO: 2.3 %
ERYTHROCYTE [DISTWIDTH] IN BLOOD BY AUTOMATED COUNT: 12.3 % (ref 12–15)
GFRSERPLBLD MDRD-ARVRAT: 113 ML/MIN/{1.73_M2} (ref 89–?)
GLOBULIN SER-MCNC: 2.9 G/DL (ref 2.1–4.2)
GLUCOSE SERPL-MCNC: 101 MG/DL (ref 70–100)
HCT VFR BLD AUTO: 42.6 % (ref 42–52)
HDLC SERPL-MCNC: 47 MG/DL
HDLC SERPL: 4 {RATIO} (ref ?–5)
HGB UR QL STRIP: 13.8 G/DL (ref 14–18)
LDLC SERPL CALC-MCNC: 123 MG/DL
LDLC/HDLC SERPL: 2.6 {RATIO} (ref ?–3.6)
LYMPHOCYTES # SPEC AUTO: 1.9 10^3/UL (ref 1.5–3.5)
LYMPHOCYTES NFR BLD AUTO: 23.1 %
MCH RBC QN AUTO: 30.7 PG (ref 27–31)
MCHC RBC AUTO-ENTMCNC: 32.4 G/DL (ref 32–36)
MCV RBC AUTO: 94.9 FL (ref 80–94)
MONOCYTES # BLD AUTO: 0.9 10^3/UL (ref 0–1)
MONOCYTES NFR BLD AUTO: 11.4 %
NEUTROPHILS # BLD AUTO: 5.1 10^3/UL (ref 1.5–6.6)
NEUTROPHILS # SNV AUTO: 8.2 X10^3/UL (ref 4.8–10.8)
NEUTROPHILS NFR BLD AUTO: 62.4 %
NRBC # BLD AUTO: 0 /100WBC
NRBC # BLD AUTO: 0 X10^3/UL
PDW BLD AUTO: 11.3 FL (ref 7.4–11.4)
PLATELET # BLD: 211 10^3/UL (ref 130–450)
POTASSIUM SERPL-SCNC: 4.1 MMOL/L (ref 3.5–5)
PROT SPEC-MCNC: 6.9 G/DL (ref 6.7–8.2)
PSA SERPL-MCNC: 0.98 NG/ML (ref 0–2)
RBC MAR: 4.49 10^6/UL (ref 4.7–6.1)
SODIUM SERPLBLD-SCNC: 143 MMOL/L (ref 135–145)
TRIGL P FAST SERPL-MCNC: 81 MG/DL
VLDLC SERPL-SCNC: 16 MG/DL

## 2022-10-26 PROCEDURE — 85025 COMPLETE CBC W/AUTO DIFF WBC: CPT

## 2022-10-26 PROCEDURE — 83721 ASSAY OF BLOOD LIPOPROTEIN: CPT

## 2022-10-26 PROCEDURE — 84443 ASSAY THYROID STIM HORMONE: CPT

## 2022-10-26 PROCEDURE — 80053 COMPREHEN METABOLIC PANEL: CPT

## 2022-10-26 PROCEDURE — 80061 LIPID PANEL: CPT

## 2022-10-26 PROCEDURE — 84153 ASSAY OF PSA TOTAL: CPT

## 2023-11-29 ENCOUNTER — HOSPITAL ENCOUNTER (OUTPATIENT)
Dept: HOSPITAL 76 - LAB | Age: 68
Discharge: HOME | End: 2023-11-29
Attending: INTERNAL MEDICINE
Payer: MEDICARE

## 2023-11-29 DIAGNOSIS — Z79.899: ICD-10-CM

## 2023-11-29 DIAGNOSIS — Z00.00: Primary | ICD-10-CM

## 2023-11-29 DIAGNOSIS — E78.5: ICD-10-CM

## 2023-11-29 DIAGNOSIS — N20.0: ICD-10-CM

## 2023-11-29 DIAGNOSIS — L40.9: ICD-10-CM

## 2023-11-29 DIAGNOSIS — J44.9: ICD-10-CM

## 2023-11-29 DIAGNOSIS — Z87.820: ICD-10-CM

## 2023-11-29 DIAGNOSIS — Z86.010: ICD-10-CM

## 2023-11-29 DIAGNOSIS — R56.9: ICD-10-CM

## 2023-11-29 LAB
ALBUMIN DIAFP-MCNC: 4.1 G/DL (ref 3.2–5.5)
ALBUMIN/GLOB SERPL: 2 {RATIO} (ref 1–2.2)
ALP SERPL-CCNC: 49 IU/L (ref 42–121)
ALT SERPL W P-5'-P-CCNC: 23 IU/L (ref 10–60)
ANION GAP SERPL CALCULATED.4IONS-SCNC: 6 MMOL/L (ref 6–13)
AST SERPL W P-5'-P-CCNC: 22 IU/L (ref 10–42)
BASOPHILS NFR BLD AUTO: 0 10^3/UL (ref 0–0.1)
BASOPHILS NFR BLD AUTO: 0.6 %
BILIRUB BLD-MCNC: 0.5 MG/DL (ref 0.2–1)
BUN SERPL-MCNC: 23 MG/DL (ref 6–20)
CALCIUM UR-MCNC: 9.3 MG/DL (ref 8.5–10.3)
CHLORIDE SERPL-SCNC: 112 MMOL/L (ref 101–111)
CHOLEST SERPL-MCNC: 161 MG/DL
CO2 SERPL-SCNC: 24 MMOL/L (ref 21–32)
CREAT SERPLBLD-SCNC: 0.9 MG/DL (ref 0.6–1.3)
EOSINOPHIL # BLD AUTO: 0.3 10^3/UL (ref 0–0.7)
EOSINOPHIL NFR BLD AUTO: 3.9 %
ERYTHROCYTE [DISTWIDTH] IN BLOOD BY AUTOMATED COUNT: 12.2 % (ref 12–15)
GFRSERPLBLD MDRD-ARVRAT: 84 ML/MIN/{1.73_M2} (ref 89–?)
GLOBULIN SER-MCNC: 2.1 G/DL (ref 2.1–4.2)
GLUCOSE SERPL-MCNC: 103 MG/DL (ref 74–104)
HCT VFR BLD AUTO: 42.7 % (ref 42–52)
HDLC SERPL-MCNC: 36 MG/DL
HDLC SERPL: 4.5 {RATIO} (ref ?–5)
HGB UR QL STRIP: 13.6 G/DL (ref 14–18)
LDLC SERPL CALC-MCNC: 78 MG/DL
LDLC/HDLC SERPL: 2.2 {RATIO} (ref ?–3.6)
LYMPHOCYTES # SPEC AUTO: 1.5 10^3/UL (ref 1.5–3.5)
LYMPHOCYTES NFR BLD AUTO: 24 %
MCH RBC QN AUTO: 30.4 PG (ref 27–31)
MCHC RBC AUTO-ENTMCNC: 31.9 G/DL (ref 32–36)
MCV RBC AUTO: 95.5 FL (ref 80–94)
MONOCYTES # BLD AUTO: 0.7 10^3/UL (ref 0–1)
MONOCYTES NFR BLD AUTO: 10.9 %
NEUTROPHILS # BLD AUTO: 3.9 10^3/UL (ref 1.5–6.6)
NEUTROPHILS # SNV AUTO: 6.4 X10^3/UL (ref 4.8–10.8)
NEUTROPHILS NFR BLD AUTO: 60.3 %
NRBC # BLD AUTO: 0 /100WBC
NRBC # BLD AUTO: 0 X10^3/UL
PDW BLD AUTO: 9.9 FL (ref 7.4–11.4)
PLATELET # BLD: 199 10^3/UL (ref 130–450)
POTASSIUM SERPL-SCNC: 4.1 MMOL/L (ref 3.5–4.5)
PROT SPEC-MCNC: 6.2 G/DL (ref 6.4–8.9)
RBC MAR: 4.47 10^6/UL (ref 4.7–6.1)
SODIUM SERPLBLD-SCNC: 142 MMOL/L (ref 135–145)
TRIGL P FAST SERPL-MCNC: 236 MG/DL (ref 48–352)
TSH SERPL-ACNC: 1.15 UIU/ML (ref 0.34–5.6)
VLDLC SERPL-SCNC: 47 MG/DL

## 2023-11-29 PROCEDURE — 80061 LIPID PANEL: CPT

## 2023-11-29 PROCEDURE — 85025 COMPLETE CBC W/AUTO DIFF WBC: CPT

## 2023-11-29 PROCEDURE — 82306 VITAMIN D 25 HYDROXY: CPT

## 2023-11-29 PROCEDURE — 80053 COMPREHEN METABOLIC PANEL: CPT

## 2023-11-29 PROCEDURE — 83721 ASSAY OF BLOOD LIPOPROTEIN: CPT

## 2023-11-29 PROCEDURE — 84443 ASSAY THYROID STIM HORMONE: CPT

## 2023-11-29 PROCEDURE — 36415 COLL VENOUS BLD VENIPUNCTURE: CPT

## 2024-03-15 ENCOUNTER — HOSPITAL ENCOUNTER (OUTPATIENT)
Dept: HOSPITAL 76 - DI | Age: 69
Discharge: HOME | End: 2024-03-15
Attending: INTERNAL MEDICINE
Payer: MEDICARE

## 2024-03-15 DIAGNOSIS — R05.9: Primary | ICD-10-CM

## 2024-03-15 NOTE — XRAY REPORT
PROCEDURE:  Chest 2V

 

INDICATIONS:  COUGH

 

TECHNIQUE:  2 views of the chest were acquired.  

 

COMPARISON:  5/27/2021.

 

FINDINGS:  

 

Surgical changes and devices:  Left chest wall pacemaker is seen.  

 

Lungs and pleura:  No pleural effusions or pneumothorax.  Lungs are clear.  

 

Mediastinum:  Mediastinal contours appear normal.  Heart size is normal.  

 

Bones and chest wall:  No suspicious bony lesions.  Overlying soft tissues appear unremarkable.  

 

 

IMPRESSION:  

 

No acute cardiopulmonary process.

 

 

 

Reviewed by: Jose Henderson MD on 3/15/2024 11:29 AM PDT

Approved by: Jose Henderson MD on 3/15/2024 11:29 AM PDT

 

 

Station ID:  [id number]